# Patient Record
Sex: FEMALE | Race: WHITE | NOT HISPANIC OR LATINO | Employment: UNEMPLOYED | URBAN - METROPOLITAN AREA
[De-identification: names, ages, dates, MRNs, and addresses within clinical notes are randomized per-mention and may not be internally consistent; named-entity substitution may affect disease eponyms.]

---

## 2017-02-01 ENCOUNTER — OFFICE VISIT (OUTPATIENT)
Dept: INTERNAL MEDICINE | Facility: CLINIC | Age: 37
End: 2017-02-01

## 2017-02-01 VITALS
HEART RATE: 74 BPM | HEIGHT: 66 IN | BODY MASS INDEX: 29.42 KG/M2 | OXYGEN SATURATION: 100 % | DIASTOLIC BLOOD PRESSURE: 82 MMHG | SYSTOLIC BLOOD PRESSURE: 138 MMHG | WEIGHT: 183.06 LBS

## 2017-02-01 DIAGNOSIS — E66.3 OVERWEIGHT: Primary | ICD-10-CM

## 2017-02-01 DIAGNOSIS — E66.9 NON MORBID OBESITY, UNSPECIFIED OBESITY TYPE: ICD-10-CM

## 2017-02-01 DIAGNOSIS — G47.00 INSOMNIA, UNSPECIFIED TYPE: ICD-10-CM

## 2017-02-01 PROCEDURE — 99213 OFFICE O/P EST LOW 20 MIN: CPT | Performed by: NURSE PRACTITIONER

## 2017-02-01 RX ORDER — AMITRIPTYLINE HYDROCHLORIDE 25 MG/1
TABLET, FILM COATED ORAL
Qty: 90 TABLET | Refills: 2 | Status: SHIPPED | OUTPATIENT
Start: 2017-02-01 | End: 2017-03-08

## 2017-02-01 NOTE — PROGRESS NOTES
Subjective   Mirian Allred is a 36 y.o. female.     History of Present Illness   The patient is here today to F/U on obesity. Qsymia restarted 12/7/2016. Wt loss goal 8 lbs, pt down 3. She is having a lot of stress with his job. He is in IT,  over network operations. Seh is an emotional eater.   Is having some trouble sleeping as well with stress.   Anxiety- worse, had been on cymbalta in the past but had suicidal ideation.   Is not tracking calories.   The following portions of the patient's history were reviewed and updated as appropriate: allergies, current medications, past family history, past medical history, past social history, past surgical history and problem list.    Review of Systems   Constitutional: Negative.    Respiratory: Negative.    Cardiovascular: Negative.    Psychiatric/Behavioral: Positive for sleep disturbance. Negative for dysphoric mood and suicidal ideas. The patient is nervous/anxious.        Objective   Physical Exam   Constitutional: She appears well-developed and well-nourished.   Neck: Normal range of motion. Neck supple. No thyromegaly present.   Cardiovascular: Normal rate, regular rhythm, normal heart sounds and intact distal pulses.    Pulmonary/Chest: Effort normal and breath sounds normal.   Skin: Skin is warm and dry.   Psychiatric: Her behavior is normal. Judgment and thought content normal. Her mood appears anxious.       Assessment/Plan   Mirian was seen today for obesity, hypertension and heartburn.    Diagnoses and all orders for this visit:    Overweight    Insomnia, unspecified type  -     amitriptyline (ELAVIL) 25 MG tablet; Take 1-3 tablets at bedtime PRN insomnia    Non morbid obesity, unspecified obesity type  -     Phentermine-Topiramate 7.5-46 MG capsule sustained-release 24 hr; Take 1 tablet by mouth Daily.        1. Overweight- continue Qsymia- track calories. Walk when able. Try to focus on food.   2. Insomnia- try elavil 25 mg 1-3 tabs at bedtime.

## 2017-03-03 DIAGNOSIS — Z30.41 ENCOUNTER FOR SURVEILLANCE OF CONTRACEPTIVE PILLS: ICD-10-CM

## 2017-03-03 RX ORDER — DROSPIRENONE AND ETHINYL ESTRADIOL 0.03MG-3MG
KIT ORAL
Qty: 90 TABLET | Refills: 0 | Status: SHIPPED | OUTPATIENT
Start: 2017-03-03 | End: 2017-05-22 | Stop reason: SDUPTHER

## 2017-03-08 ENCOUNTER — OFFICE VISIT (OUTPATIENT)
Dept: INTERNAL MEDICINE | Facility: CLINIC | Age: 37
End: 2017-03-08

## 2017-03-08 VITALS
SYSTOLIC BLOOD PRESSURE: 116 MMHG | OXYGEN SATURATION: 98 % | HEIGHT: 66 IN | HEART RATE: 69 BPM | DIASTOLIC BLOOD PRESSURE: 82 MMHG | WEIGHT: 183.8 LBS | BODY MASS INDEX: 29.54 KG/M2

## 2017-03-08 DIAGNOSIS — E66.3 OVERWEIGHT: Primary | ICD-10-CM

## 2017-03-08 DIAGNOSIS — I10 BENIGN ESSENTIAL HYPERTENSION: ICD-10-CM

## 2017-03-08 DIAGNOSIS — E66.9 NON MORBID OBESITY, UNSPECIFIED OBESITY TYPE: ICD-10-CM

## 2017-03-08 PROCEDURE — 99213 OFFICE O/P EST LOW 20 MIN: CPT | Performed by: NURSE PRACTITIONER

## 2017-03-08 NOTE — PROGRESS NOTES
Subjective   Mirian Allred is a 36 y.o. female.     History of Present Illness   The patient is here today to follow-up on weight.  Q Elen re-initiated December 7, 2016.  Initial weight was 186.9 pounds.  Current weight is 183 pounds.  She has lost 0 weight in the last 4 weeks. At last visit discussed the importance of calorie tracking. Taking Qsymia daily with out SEs.     Insomnia- much improved with adding in exercise, Elavil made her too sleepy.     HTN-Pt is doing well with current medication regimen, denies adverse reactions, compliant with medication schedule.     At home down 3 lbs. She is back at the gym, tracking calories. Feeling better overall. Averaging about 1100 calories a day. She is at the gym 3 times a week.   The following portions of the patient's history were reviewed and updated as appropriate: allergies, current medications, past family history, past medical history, past social history, past surgical history and problem list.    Review of Systems   Constitutional: Negative.    Respiratory: Negative.    Cardiovascular: Negative.    Psychiatric/Behavioral: Negative.        Objective   Physical Exam   Constitutional: She appears well-developed and well-nourished.   Neck: Normal range of motion. Neck supple. No thyromegaly present.   Cardiovascular: Normal rate, regular rhythm, normal heart sounds and intact distal pulses.    Pulmonary/Chest: Effort normal and breath sounds normal.   Skin: Skin is warm and dry.   Psychiatric: She has a normal mood and affect. Her behavior is normal. Judgment and thought content normal.       Assessment/Plan   Mirian was seen today for weight check and insomnia.    Diagnoses and all orders for this visit:    Overweight    Non morbid obesity, unspecified obesity type  -     Phentermine-Topiramate 7.5-46 MG capsule sustained-release 24 hr; Take 1 tablet by mouth Daily.      1. Overweight- needs to increase caloric goal to at least 1400 calories a day,  more with activity. F/U in 6 weeks, goal wt loss is 3 lbs. Qsymia refill given, pt aware of appropriate use and SEs.   2. HTN- continue to monitor as wt is lost.

## 2017-03-22 ENCOUNTER — TELEPHONE (OUTPATIENT)
Dept: INTERNAL MEDICINE | Facility: CLINIC | Age: 37
End: 2017-03-22

## 2017-03-22 DIAGNOSIS — M25.511 RIGHT SHOULDER PAIN, UNSPECIFIED CHRONICITY: Primary | ICD-10-CM

## 2017-03-22 DIAGNOSIS — I10 ESSENTIAL HYPERTENSION: ICD-10-CM

## 2017-03-22 RX ORDER — VALSARTAN 80 MG/1
TABLET ORAL
Qty: 90 TABLET | Refills: 0 | Status: SHIPPED | OUTPATIENT
Start: 2017-03-22 | End: 2017-07-01 | Stop reason: SDUPTHER

## 2017-03-22 NOTE — TELEPHONE ENCOUNTER
----- Message from Arianne Brito sent at 3/22/2017  3:05 PM EDT -----  Patient states when she saw Indu last, she mentioned shoulder pain. She said her shoulder pain has gotten worse. Does she need to be referred to someone?\  Phone: [phone]

## 2017-03-23 ENCOUNTER — TELEPHONE (OUTPATIENT)
Dept: INTERNAL MEDICINE | Facility: CLINIC | Age: 37
End: 2017-03-23

## 2017-03-23 DIAGNOSIS — M25.511 RIGHT SHOULDER PAIN, UNSPECIFIED CHRONICITY: Primary | ICD-10-CM

## 2017-03-23 NOTE — TELEPHONE ENCOUNTER
PT. RETURNED CALL AND SAID THAT HER RT. SHOULDER PAIN IS WORSE AND SHE MENTIONED IT TO NATALI AT HER LAST VISIT BRIEFLY BUT IT WASN'T AS BAD, BUT NOW SHE CANNOT RAISE HER ARM ALL THE WAY UP OR OUT. THE PAIN STARTED 2 MTHS. AGO BUT ITS WORSE NOW.   I DID NOT SEE ANYTHING DOCUMENTED IN NATALI'S LAST VISIT IN REGARDS TO THE SHOULDER PAIN, BUT I WILL ASK DR. LAY WHAT WE CAN DO NOW SINCE NATALI IS OOT.   CELL [phone]

## 2017-03-23 NOTE — TELEPHONE ENCOUNTER
PER DR. LAY  PT. CAN BE REFERRED TO P/T DOWNSTAIRS. I CALLED AND RELAYED THIS TO PT. ENTERED IN REF. AND MARKED URGENT.

## 2017-04-11 ENCOUNTER — OFFICE VISIT (OUTPATIENT)
Dept: SPORTS MEDICINE | Facility: CLINIC | Age: 37
End: 2017-04-11

## 2017-04-11 VITALS
BODY MASS INDEX: 29.41 KG/M2 | WEIGHT: 183 LBS | HEIGHT: 66 IN | SYSTOLIC BLOOD PRESSURE: 116 MMHG | DIASTOLIC BLOOD PRESSURE: 68 MMHG

## 2017-04-11 DIAGNOSIS — M75.41 SHOULDER IMPINGEMENT, RIGHT: ICD-10-CM

## 2017-04-11 DIAGNOSIS — M25.511 CHRONIC RIGHT SHOULDER PAIN: Primary | ICD-10-CM

## 2017-04-11 DIAGNOSIS — G89.29 CHRONIC RIGHT SHOULDER PAIN: Primary | ICD-10-CM

## 2017-04-11 DIAGNOSIS — G25.89 SCAPULAR DYSKINESIS: ICD-10-CM

## 2017-04-11 PROCEDURE — 99244 OFF/OP CNSLTJ NEW/EST MOD 40: CPT | Performed by: FAMILY MEDICINE

## 2017-04-11 PROCEDURE — 73030 X-RAY EXAM OF SHOULDER: CPT | Performed by: FAMILY MEDICINE

## 2017-04-11 RX ORDER — IBUPROFEN 800 MG/1
800 TABLET ORAL EVERY 8 HOURS
COMMUNITY
End: 2017-04-11

## 2017-04-11 RX ORDER — SIMVASTATIN 20 MG
20 TABLET ORAL
COMMUNITY
End: 2017-05-26

## 2017-04-11 RX ORDER — NORGESTIMATE AND ETHINYL ESTRADIOL 0.25-0.035
1 KIT ORAL
COMMUNITY
End: 2017-05-22

## 2017-04-11 RX ORDER — DICLOFENAC SODIUM 75 MG/1
75 TABLET, DELAYED RELEASE ORAL 2 TIMES DAILY
Qty: 60 TABLET | Refills: 1 | Status: SHIPPED | OUTPATIENT
Start: 2017-04-11 | End: 2017-07-17

## 2017-04-11 RX ORDER — DULOXETIN HYDROCHLORIDE 30 MG/1
30 CAPSULE, DELAYED RELEASE ORAL
COMMUNITY
End: 2017-07-17

## 2017-04-11 NOTE — PROGRESS NOTES
"Mirian is a 36 y.o. year old female    Chief Complaint   Patient presents with   • Right Shoulder - Pain       History of Present Illness  Mirian is referred here today for right shoulder pain by Indu Horne. She describes having pain for at least 3 months, started without any injury but she is exercising frequently as part of a weight loss regimen (has lost 100 pounds so far). Pain is constant, gradually worsening, moderately severe, mostly tight in nature. Worse with use of the arm. Radiates from the shoulder down the arm and around the back of the shoulder as well. No assoc sx.     I have reviewed the patient's medical history in detail and updated the computerized patient record.    Review of Systems   Constitutional: Negative for fever.   Musculoskeletal: Negative for neck pain.   Skin: Negative for wound.   Neurological: Negative for weakness and numbness.   All other systems reviewed and are negative.      /68  Ht 66\" (167.6 cm)  Wt 183 lb (83 kg)  BMI 29.54 kg/m2     Physical Exam    Vital signs reviewed.   General: No acute distress.  Eyes: conjunctiva clear; pupils equally round and reactive  ENT: external ears and nose atraumatic; oropharynx clear  CV: no peripheral edema, 2+ distal pulses  Resp: normal respiratory effort, no use of accessory muscles  Skin: no rashes or wounds; normal turgor  Psych: mood and affect appropriate; recent and remote memory intact  Neuro: sensation to light touch intact    MSK Exam:  R shoulder: Normal appearance except scapular asymmetry at rest and with movement. TTP with trigger points in the levator scapulae and rhomboids. Normal ROM. Neg Neer, pos Melton. Normal cuff strength but pain with ER and empty can. Neg jesus and yergason.     Right Shoulder X-Ray  Indication: Pain  AP Internal and External Rotation, Axillary views    Findings:  No fracture  No bony lesion  Normal soft tissues  Normal joint spaces    No prior studies were available for " comparison.      Diagnoses and all orders for this visit:    Chronic right shoulder pain  -     XR Shoulder 2+ View Right  -     Ambulatory Referral to Physical Therapy Evaluate and treat  -     diclofenac (VOLTAREN) 75 MG EC tablet; Take 1 tablet by mouth 2 (Two) Times a Day.    Shoulder impingement, right  -     Ambulatory Referral to Physical Therapy Evaluate and treat  -     diclofenac (VOLTAREN) 75 MG EC tablet; Take 1 tablet by mouth 2 (Two) Times a Day.    Scapular dyskinesis  -     Ambulatory Referral to Physical Therapy Evaluate and treat  -     diclofenac (VOLTAREN) 75 MG EC tablet; Take 1 tablet by mouth 2 (Two) Times a Day.    Other orders  -     simvastatin (ZOCOR) 20 MG tablet; Take 20 mg by mouth.  -     norgestimate-ethinyl estradiol (ORTHO-CYCLEN) 0.25-35 MG-MCG per tablet; Take 1 tablet by mouth.  -     DULoxetine (CYMBALTA) 30 MG capsule; Take 30 mg by mouth.  -     Discontinue: ibuprofen (ADVIL,MOTRIN) 800 MG tablet; Take 800 mg by mouth Every 8 (Eight) Hours.    This appears most likely to be a combination of impingement and scapular dysfunction. Will start by optimizing pain relief to tolerate PT, consider steroid burst if diclofenac doesn't work better than ibuprofen. May need to consider trigger point and subacromial injections as well. Recheck for progress in about 4 weeks.

## 2017-04-13 ENCOUNTER — TREATMENT (OUTPATIENT)
Dept: PHYSICAL THERAPY | Facility: CLINIC | Age: 37
End: 2017-04-13

## 2017-04-13 DIAGNOSIS — M25.511 CHRONIC RIGHT SHOULDER PAIN: Primary | ICD-10-CM

## 2017-04-13 DIAGNOSIS — G89.29 CHRONIC RIGHT SHOULDER PAIN: Primary | ICD-10-CM

## 2017-04-13 PROCEDURE — 97110 THERAPEUTIC EXERCISES: CPT | Performed by: PHYSICAL THERAPIST

## 2017-04-13 PROCEDURE — 97161 PT EVAL LOW COMPLEX 20 MIN: CPT | Performed by: PHYSICAL THERAPIST

## 2017-04-13 NOTE — PROGRESS NOTES
Physical Therapy Initial Evaluation and Plan of Care    Patient: Mirian Allred   : 1980  Diagnosis/ICD-10 Code:  Chronic right shoulder pain [M25.511, G89.29]  Referring practitioner: Inder Pichardo MD    Subjective Evaluation    History of Present Illness  Onset date: 3 mos ago.  Mechanism of injury: Pt reports she is unsure as to what caused pain. Took a week off from the gym and woke up one morning in pain. Pt states pain is in superior right shoulder, radiates into upper arm and collarbone/chest. Can also feel pain in shoulder blade and neck. Denies sharp pain, numbness, or tingling. Describes pain as ache that increases with activity. Pain worsens throughout the day and with increased activity. Anti-inflammatory seems to be helping. Pt states she is a side sleeper, but lately, has not been able to sleep on R side. Showering, washing hair, drying hair, and doing makeup cause intense pain. Has not been able to go the gym. Using elliptical with arms is painful.  Pt is a stay at home mom.  Hx of R shoulder pain several years ago with no identifiable cause. Pt reports pinching nerve with intense, sharp pain. Resolved w/ muscle relaxers.    Quality of life: good    Pain  Current pain ratin  At best pain ratin  At worst pain ratin  Location: Superior right shoulder, radiates into upper arm, collarbone, and chest. Can also feel pain in shoulder blade and neck.  Quality: dull ache  Relieving factors: medications and change in position  Aggravating factors: overhead activity, lifting and movement  Progression: no change (Improved w/ anti-inflammatory)    Social Support  Lives with: spouse and young children    Hand dominance: right    Diagnostic Tests  X-ray: normal    Treatments  Previous treatment: medication  Current treatment: medication and physical therapy  Patient Goals  Patient goals for therapy: decreased pain, increased strength and return to sport/leisure activities  Patient goal:  How to avoid this in the future    STG 2 weeks  Pt will be independent with initial HEP  Pt will report pain <5/10 w/ ADLs  Pt will be able to tolerate therapeutic strengthening w/o increased s/s    LTG 6 weeks  Pt will have 5/5 UE strength to allow for ADLs  Pt will be able to perform self-care routine w/o significant pain or limitation  Pt will demonstrate proper head and shoulder posture while seated w/ min to no verbal cuing  Pt will score </= 15 on QuickDASH           Objective     Postural Observations  Seated posture: fair        Tenderness     Right Shoulder  No tenderness     Cervical/Thoracic Screen   Cervical range of motion within normal limits  Thoracic range of motion within normal limits with the following exceptions: Limited thoracic extension and rotation lionel    Active Range of Motion   Left Shoulder   Flexion: WFL  Abduction: WFL  External rotation BTH: T1   Internal rotation BTB: T5     Right Shoulder   Flexion: WFL  Abduction: WFL  External rotation BTH: T1 WFL  Internal rotation BTB: T7 WFL    Additional Active Range of Motion Details  Reports tightness w/ IR reach on R    Joint Play     Right Shoulder  Hypermobile in the anterior capsule. Hypomobile in the posterior capsule.     Additional Joint Play Details  Anterior translation of R humeral head    Strength/Myotome Testing     Left Shoulder     Planes of Motion   Flexion: 4+   Abduction: 5   External rotation at 0°: 5   Internal rotation at 0°: 5     Isolated Muscles   Biceps: 5   Triceps: 5     Right Shoulder     Planes of Motion   Flexion: 4+   Abduction: 5   External rotation at 0°: 5   Internal rotation at 0°: 5     Isolated Muscles   Biceps: 5   Upper trapezius: 5     Tests     Right Shoulder   Positive anterior load and shift, active compression ([place]), empty can, Hawkin's and Neer's.   Negative full can.          Assessment & Plan     Assessment  Impairments: abnormal muscle tone, abnormal or restricted ROM, activity intolerance,  impaired physical strength, lacks appropriate home exercise program and pain with function  Assessment details: Pt will benefit from skilled PT services in order to address listed impairments and increase tolerance to normal daily activities including ADL's, work and recreational activities.    Prognosis: good    Goals  How to avoid this in the future    STG 2 weeks  Pt will be independent with initial HEP  Pt will report pain <5/10 w/ ADLs  Pt will be able to tolerate therapeutic strengthening w/o increased s/s    LTG 6 weeks  Pt will have 5/5 UE strength to allow for ADLs  Pt will be able to perform self-care routine w/o significant pain or limitation  Pt will demonstrate proper head and shoulder posture while seated w/ min to no verbal cuing  Pt will score </= 15 on QuickDASH    Plan  Therapy options: will be seen for skilled physical therapy services  Planned modality interventions: cryotherapy, electrical stimulation/[place] stimulation, ultrasound, iontophoresis and thermotherapy (hydrocollator packs)  Planned therapy interventions: postural training, soft tissue mobilization, joint mobilization, stretching, strengthening, home exercise program, functional ROM exercises, flexibility, body mechanics training, manual therapy and neuromuscular re-education  Frequency: 3x week  Duration in weeks: 6  Treatment plan discussed with: patient        Manual Therapy:    8     mins  49984;  Therapeutic Exercise:    10     mins  69180;     Neuromuscular Alessandra:    0    mins  24488;    Therapeutic Activity:     0     mins  48206;     Gait Trainin     mins  64129;     Ultrasound:     0     mins  98841;    Electrical Stimulation:    0     mins  40875 ( );  Dry Needling     0     mins self-pay    Timed Treatment:   18   mins   Total Treatment:     18   mins    PT SIGNATURE: Priscila Lara PT   DATE TREATMENT INITIATED: 2017    Initial Certification  Certification Period: 2017  I certify that the  therapy services are furnished while this patient is under my care.  The services outlined above are required by this patient, and will be reviewed every 90 days.     PHYSICIAN: Inder Pichardo MD      DATE:     Please sign and return via fax to [phone].. Thank you, [facility].

## 2017-04-19 ENCOUNTER — TREATMENT (OUTPATIENT)
Dept: PHYSICAL THERAPY | Facility: CLINIC | Age: 37
End: 2017-04-19

## 2017-04-19 DIAGNOSIS — M25.511 CHRONIC RIGHT SHOULDER PAIN: Primary | ICD-10-CM

## 2017-04-19 DIAGNOSIS — G89.29 CHRONIC RIGHT SHOULDER PAIN: Primary | ICD-10-CM

## 2017-04-19 PROCEDURE — 97140 MANUAL THERAPY 1/> REGIONS: CPT | Performed by: PHYSICAL THERAPIST

## 2017-04-19 PROCEDURE — 97110 THERAPEUTIC EXERCISES: CPT | Performed by: PHYSICAL THERAPIST

## 2017-04-19 NOTE — PROGRESS NOTES
Physical Therapy Daily Progress Note    Time In 1028  Time Out 1107    [facility] reports: No issues with HEP and paion is better than it was. Backed off of medicine.    Subjective     Objective   See Exercise, Manual, and Modality Logs for complete treatment.       Assessment/Plan  Pt continues to have pain-free PROM WNL. Resisted bilateral external rotation reproduced pt's symptoms of anterior shoulder pain. Tolerated isolated ER well and added to HEP. Pt will continue to benefit from skilled PT to improve R shoulder posture and stability and decrease pain with activity.           Manual Therapy:    10     mins  58226;  Therapeutic Exercise:    29     mins  42247;     Neuromuscular Alessandra:    0    mins  90510;    Therapeutic Activity:     0     mins  94054;     Gait Trainin     mins  45269;     Ultrasound:     0     mins  79601;    Work Hardening           0      mins 02451  Iontophoresis               0   mins 96510    Timed Treatment:   39   mins   Total Treatment:     39   mins    Priscila Lara, PT  Physical Therapist

## 2017-04-27 ENCOUNTER — TREATMENT (OUTPATIENT)
Dept: PHYSICAL THERAPY | Facility: CLINIC | Age: 37
End: 2017-04-27

## 2017-04-27 DIAGNOSIS — G89.29 CHRONIC RIGHT SHOULDER PAIN: Primary | ICD-10-CM

## 2017-04-27 DIAGNOSIS — M25.511 CHRONIC RIGHT SHOULDER PAIN: Primary | ICD-10-CM

## 2017-04-27 PROCEDURE — 97110 THERAPEUTIC EXERCISES: CPT | Performed by: PHYSICAL THERAPIST

## 2017-04-27 PROCEDURE — G0283 ELEC STIM OTHER THAN WOUND: HCPCS | Performed by: PHYSICAL THERAPIST

## 2017-04-27 NOTE — PROGRESS NOTES
Physical Therapy Daily Progress Note    Time In 930  Time Out 1020    Mirian Allred reports: More sore in R upper arm/shoulder. Feels like a bruise. Might have been how I slept. Haven't been able to do HEP much due to illness in family.    Subjective     Objective   PROM WNL  Non-TTP    See Exercise, Manual, and Modality Logs for complete treatment.       Assessment/Plan  Pt demonstrated minimal limitations in ROM during therapeutic exercise. Tolerated increase in strengthening well w/o c/o pain. Required cues for posture. Will continue to benefit from strengthening to increase shoulder stability and decrease pain with activity.                Manual Therapy:    10     mins  94111;  Therapeutic Exercise:    25     mins  07945;     Neuromuscular Alessandra:    0    mins  65210;    Therapeutic Activity:     0     mins  13637;     Gait Trainin     mins  48722;     Ultrasound:     0     mins  38084;    Work Hardening           0      mins 84185  Iontophoresis               0   mins 03799    Timed Treatment:   35   mins   Total Treatment:     50   mins    Priscila Lara, PT  Physical Therapist

## 2017-05-16 ENCOUNTER — OFFICE VISIT (OUTPATIENT)
Dept: SPORTS MEDICINE | Facility: CLINIC | Age: 37
End: 2017-05-16

## 2017-05-16 VITALS
SYSTOLIC BLOOD PRESSURE: 118 MMHG | HEIGHT: 66 IN | DIASTOLIC BLOOD PRESSURE: 70 MMHG | WEIGHT: 183 LBS | BODY MASS INDEX: 29.41 KG/M2

## 2017-05-16 DIAGNOSIS — G25.89 SCAPULAR DYSKINESIS: ICD-10-CM

## 2017-05-16 DIAGNOSIS — M75.41 SHOULDER IMPINGEMENT, RIGHT: Primary | ICD-10-CM

## 2017-05-16 PROCEDURE — 20610 DRAIN/INJ JOINT/BURSA W/O US: CPT | Performed by: FAMILY MEDICINE

## 2017-05-16 PROCEDURE — 99213 OFFICE O/P EST LOW 20 MIN: CPT | Performed by: FAMILY MEDICINE

## 2017-05-16 RX ORDER — TRIAMCINOLONE ACETONIDE 40 MG/ML
80 INJECTION, SUSPENSION INTRA-ARTICULAR; INTRAMUSCULAR ONCE
Status: COMPLETED | OUTPATIENT
Start: 2017-05-16 | End: 2017-05-16

## 2017-05-16 RX ADMIN — TRIAMCINOLONE ACETONIDE 80 MG: 40 INJECTION, SUSPENSION INTRA-ARTICULAR; INTRAMUSCULAR at 11:16

## 2017-05-19 ENCOUNTER — TREATMENT (OUTPATIENT)
Dept: PHYSICAL THERAPY | Facility: CLINIC | Age: 37
End: 2017-05-19

## 2017-05-19 DIAGNOSIS — G89.29 CHRONIC RIGHT SHOULDER PAIN: Primary | ICD-10-CM

## 2017-05-19 DIAGNOSIS — M25.511 CHRONIC RIGHT SHOULDER PAIN: Primary | ICD-10-CM

## 2017-05-19 PROCEDURE — 97110 THERAPEUTIC EXERCISES: CPT | Performed by: PHYSICAL THERAPIST

## 2017-05-22 DIAGNOSIS — Z30.41 ENCOUNTER FOR SURVEILLANCE OF CONTRACEPTIVE PILLS: ICD-10-CM

## 2017-05-22 RX ORDER — DROSPIRENONE AND ETHINYL ESTRADIOL 0.03MG-3MG
1 KIT ORAL DAILY
Qty: 90 TABLET | Refills: 0 | Status: SHIPPED | OUTPATIENT
Start: 2017-05-22 | End: 2017-08-15 | Stop reason: SDUPTHER

## 2017-05-26 ENCOUNTER — OFFICE VISIT (OUTPATIENT)
Dept: INTERNAL MEDICINE | Facility: CLINIC | Age: 37
End: 2017-05-26

## 2017-05-26 VITALS
SYSTOLIC BLOOD PRESSURE: 116 MMHG | HEIGHT: 66 IN | HEART RATE: 81 BPM | OXYGEN SATURATION: 99 % | WEIGHT: 181 LBS | BODY MASS INDEX: 29.09 KG/M2 | DIASTOLIC BLOOD PRESSURE: 68 MMHG

## 2017-05-26 DIAGNOSIS — K21.9 GASTROESOPHAGEAL REFLUX DISEASE, ESOPHAGITIS PRESENCE NOT SPECIFIED: Primary | ICD-10-CM

## 2017-05-26 DIAGNOSIS — E66.3 OVERWEIGHT: ICD-10-CM

## 2017-05-26 DIAGNOSIS — I10 ESSENTIAL HYPERTENSION: ICD-10-CM

## 2017-05-26 DIAGNOSIS — E66.9 NON MORBID OBESITY, UNSPECIFIED OBESITY TYPE: ICD-10-CM

## 2017-05-26 PROCEDURE — 99214 OFFICE O/P EST MOD 30 MIN: CPT | Performed by: NURSE PRACTITIONER

## 2017-05-26 RX ORDER — OMEPRAZOLE 20 MG/1
20 CAPSULE, DELAYED RELEASE ORAL DAILY
Qty: 30 CAPSULE | Refills: 2
Start: 2017-05-26 | End: 2017-11-09

## 2017-06-14 ENCOUNTER — OFFICE VISIT (OUTPATIENT)
Dept: SPORTS MEDICINE | Facility: CLINIC | Age: 37
End: 2017-06-14

## 2017-06-14 VITALS
BODY MASS INDEX: 29.09 KG/M2 | SYSTOLIC BLOOD PRESSURE: 114 MMHG | HEIGHT: 66 IN | DIASTOLIC BLOOD PRESSURE: 68 MMHG | WEIGHT: 181 LBS

## 2017-06-14 DIAGNOSIS — G89.29 CHRONIC RIGHT SHOULDER PAIN: Primary | ICD-10-CM

## 2017-06-14 DIAGNOSIS — M25.511 CHRONIC RIGHT SHOULDER PAIN: Primary | ICD-10-CM

## 2017-06-14 DIAGNOSIS — M75.41 SHOULDER IMPINGEMENT, RIGHT: ICD-10-CM

## 2017-06-14 PROCEDURE — 99213 OFFICE O/P EST LOW 20 MIN: CPT | Performed by: FAMILY MEDICINE

## 2017-06-14 RX ORDER — DIAZEPAM 5 MG/1
TABLET ORAL
Qty: 1 TABLET | Refills: 0 | Status: SHIPPED | OUTPATIENT
Start: 2017-06-14 | End: 2017-07-17

## 2017-06-15 NOTE — PROGRESS NOTES
"Mirian is a 36 y.o. year old female    Chief Complaint   Patient presents with   • Right Shoulder - Follow-up, Pain       History of Present Illness  Unfortunately since last visit her shoulder has actually worsened, sharp, constant pain worsening with use. Injection did not help. Had to stop PT due to scheduling.     I have reviewed the patient's medical history in detail and updated the computerized patient record.    Review of Systems   Musculoskeletal: Positive for arthralgias.   Neurological: Negative for numbness.       /68  Ht 66\" (167.6 cm)  Wt 181 lb (82.1 kg)  BMI 29.21 kg/m2     Physical Exam    Vital signs reviewed.   General: No acute distress.  Eyes: conjunctiva clear; pupils equally round and reactive  ENT: external ears and nose atraumatic; oropharynx clear  CV: no peripheral edema, 2+ distal pulses  Resp: normal respiratory effort, no use of accessory muscles  Skin: no rashes or wounds; normal turgor  Psych: mood and affect appropriate; recent and remote memory intact  Neuro: sensation to light touch intact    MSK Exam:  R shoulder: Normal appearance. TTP subacromial space. ROM wnl but pain with FF and IR. +Neer/Melton. Normal RTC strength but pain with empty can. Neg Cabrera, pos crank. Neg yergason/speed.     Diagnoses and all orders for this visit:    Chronic right shoulder pain  -     FL Contrast Injection CT / MRI; Future  -     MRI shoulder right arthrogram; Future    Shoulder impingement, right  -     FL Contrast Injection CT / MRI; Future  -     MRI shoulder right arthrogram; Future    Other orders  -     diazePAM (VALIUM) 5 MG tablet; Take 1 tab by mouth 30 minutes prior to MRI    MRI to evaluate for underlying cause with failure to respond to conservative treatment x 8 weeks including PT and injection. Possible rotator cuff or labral tear.   "

## 2017-06-29 ENCOUNTER — HOSPITAL ENCOUNTER (OUTPATIENT)
Dept: MRI IMAGING | Facility: HOSPITAL | Age: 37
Discharge: HOME | End: 2017-06-29

## 2017-06-29 ENCOUNTER — HOSPITAL ENCOUNTER (OUTPATIENT)
Dept: GENERAL RADIOLOGY | Facility: HOSPITAL | Age: 37
Discharge: HOME | End: 2017-06-29
Admitting: FAMILY MEDICINE

## 2017-06-29 DIAGNOSIS — IMO0001 SUPRASPINATUS TENDON TEAR, RIGHT, SUBSEQUENT ENCOUNTER: Primary | ICD-10-CM

## 2017-06-29 DIAGNOSIS — M25.511 CHRONIC RIGHT SHOULDER PAIN: ICD-10-CM

## 2017-06-29 DIAGNOSIS — M75.41 SHOULDER IMPINGEMENT, RIGHT: ICD-10-CM

## 2017-06-29 DIAGNOSIS — G89.29 CHRONIC RIGHT SHOULDER PAIN: ICD-10-CM

## 2017-06-29 PROCEDURE — A9577 INJ MULTIHANCE: HCPCS | Performed by: RADIOLOGY

## 2017-06-29 PROCEDURE — 82565 ASSAY OF CREATININE: CPT

## 2017-06-29 PROCEDURE — 73222 MRI JOINT UPR EXTREM W/DYE: CPT

## 2017-06-29 PROCEDURE — 0 GADOBENATE DIMEGLUMINE 529 MG/ML SOLUTION: Performed by: RADIOLOGY

## 2017-06-29 PROCEDURE — 0 IOPAMIDOL 61 % SOLUTION: Performed by: RADIOLOGY

## 2017-06-29 PROCEDURE — 77002 NEEDLE LOCALIZATION BY XRAY: CPT

## 2017-06-29 RX ORDER — LIDOCAINE HYDROCHLORIDE 10 MG/ML
10 INJECTION, SOLUTION INFILTRATION; PERINEURAL ONCE
Status: COMPLETED | OUTPATIENT
Start: 2017-06-29 | End: 2017-06-29

## 2017-06-29 RX ADMIN — LIDOCAINE HYDROCHLORIDE 2 ML: 10 INJECTION, SOLUTION INFILTRATION; PERINEURAL at 09:49

## 2017-06-29 RX ADMIN — GADOBENATE DIMEGLUMINE 0.05 ML: 529 INJECTION, SOLUTION INTRAVENOUS at 09:49

## 2017-06-29 RX ADMIN — IOPAMIDOL 5 ML: 612 INJECTION, SOLUTION INTRAVENOUS at 09:49

## 2017-06-30 LAB — CREAT BLDA-MCNC: 1 MG/DL (ref 0.6–1.3)

## 2017-07-01 DIAGNOSIS — I10 ESSENTIAL HYPERTENSION: ICD-10-CM

## 2017-07-03 RX ORDER — VALSARTAN 80 MG/1
TABLET ORAL
Qty: 90 TABLET | Refills: 2 | Status: SHIPPED | OUTPATIENT
Start: 2017-07-03 | End: 2017-07-17

## 2017-07-07 ENCOUNTER — RESULTS ENCOUNTER (OUTPATIENT)
Dept: INTERNAL MEDICINE | Facility: CLINIC | Age: 37
End: 2017-07-07

## 2017-07-07 DIAGNOSIS — E66.3 OVERWEIGHT: ICD-10-CM

## 2017-07-07 DIAGNOSIS — I10 ESSENTIAL HYPERTENSION: ICD-10-CM

## 2017-07-07 DIAGNOSIS — K21.9 GASTROESOPHAGEAL REFLUX DISEASE, ESOPHAGITIS PRESENCE NOT SPECIFIED: ICD-10-CM

## 2017-07-11 ENCOUNTER — HOSPITAL ENCOUNTER (OUTPATIENT)
Facility: HOSPITAL | Age: 37
Setting detail: HOSPITAL OUTPATIENT SURGERY
End: 2017-07-11
Attending: ORTHOPAEDIC SURGERY | Admitting: ORTHOPAEDIC SURGERY

## 2017-07-17 ENCOUNTER — APPOINTMENT (OUTPATIENT)
Dept: PREADMISSION TESTING | Facility: HOSPITAL | Age: 37
End: 2017-07-17

## 2017-07-17 ENCOUNTER — TELEPHONE (OUTPATIENT)
Dept: INTERNAL MEDICINE | Facility: CLINIC | Age: 37
End: 2017-07-17

## 2017-07-17 VITALS
TEMPERATURE: 97.6 F | RESPIRATION RATE: 16 BRPM | HEIGHT: 66 IN | OXYGEN SATURATION: 100 % | BODY MASS INDEX: 29.49 KG/M2 | HEART RATE: 90 BPM | WEIGHT: 183.5 LBS

## 2017-07-17 LAB
ALBUMIN SERPL-MCNC: 3.8 G/DL (ref 3.5–5.2)
ALBUMIN/GLOB SERPL: 1.2 G/DL
ALP SERPL-CCNC: 74 U/L (ref 39–117)
ALT SERPL W P-5'-P-CCNC: 13 U/L (ref 1–33)
ANION GAP SERPL CALCULATED.3IONS-SCNC: 10.4 MMOL/L
AST SERPL-CCNC: 12 U/L (ref 1–32)
BASOPHILS # BLD AUTO: 0.07 10*3/MM3 (ref 0–0.2)
BASOPHILS NFR BLD AUTO: 0.7 % (ref 0–1.5)
BILIRUB SERPL-MCNC: 0.6 MG/DL (ref 0.1–1.2)
BUN BLD-MCNC: 12 MG/DL (ref 6–20)
BUN/CREAT SERPL: 15.2 (ref 7–25)
CALCIUM SPEC-SCNC: 9.5 MG/DL (ref 8.6–10.5)
CHLORIDE SERPL-SCNC: 105 MMOL/L (ref 98–107)
CO2 SERPL-SCNC: 24.6 MMOL/L (ref 22–29)
CREAT BLD-MCNC: 0.79 MG/DL (ref 0.57–1)
DEPRECATED RDW RBC AUTO: 44 FL (ref 37–54)
EOSINOPHIL # BLD AUTO: 0.2 10*3/MM3 (ref 0–0.7)
EOSINOPHIL NFR BLD AUTO: 2.1 % (ref 0.3–6.2)
ERYTHROCYTE [DISTWIDTH] IN BLOOD BY AUTOMATED COUNT: 13.3 % (ref 11.7–13)
GFR SERPL CREATININE-BSD FRML MDRD: 83 ML/MIN/1.73
GLOBULIN UR ELPH-MCNC: 3.2 GM/DL
GLUCOSE BLD-MCNC: 91 MG/DL (ref 65–99)
HCG SERPL QL: NEGATIVE
HCT VFR BLD AUTO: 40.3 % (ref 35.6–45.5)
HGB BLD-MCNC: 13.4 G/DL (ref 11.9–15.5)
IMM GRANULOCYTES # BLD: 0.02 10*3/MM3 (ref 0–0.03)
IMM GRANULOCYTES NFR BLD: 0.2 % (ref 0–0.5)
LYMPHOCYTES # BLD AUTO: 1.85 10*3/MM3 (ref 0.9–4.8)
LYMPHOCYTES NFR BLD AUTO: 19 % (ref 19.6–45.3)
MCH RBC QN AUTO: 29.9 PG (ref 26.9–32)
MCHC RBC AUTO-ENTMCNC: 33.3 G/DL (ref 32.4–36.3)
MCV RBC AUTO: 90 FL (ref 80.5–98.2)
MONOCYTES # BLD AUTO: 0.69 10*3/MM3 (ref 0.2–1.2)
MONOCYTES NFR BLD AUTO: 7.1 % (ref 5–12)
NEUTROPHILS # BLD AUTO: 6.91 10*3/MM3 (ref 1.9–8.1)
NEUTROPHILS NFR BLD AUTO: 70.9 % (ref 42.7–76)
PLATELET # BLD AUTO: 301 10*3/MM3 (ref 140–500)
PMV BLD AUTO: 10.1 FL (ref 6–12)
POTASSIUM BLD-SCNC: 3.7 MMOL/L (ref 3.5–5.2)
PROT SERPL-MCNC: 7 G/DL (ref 6–8.5)
RBC # BLD AUTO: 4.48 10*6/MM3 (ref 3.9–5.2)
SODIUM BLD-SCNC: 140 MMOL/L (ref 136–145)
WBC NRBC COR # BLD: 9.74 10*3/MM3 (ref 4.5–10.7)

## 2017-07-17 PROCEDURE — 80053 COMPREHEN METABOLIC PANEL: CPT | Performed by: ORTHOPAEDIC SURGERY

## 2017-07-17 PROCEDURE — 85025 COMPLETE CBC W/AUTO DIFF WBC: CPT | Performed by: ORTHOPAEDIC SURGERY

## 2017-07-17 PROCEDURE — 36415 COLL VENOUS BLD VENIPUNCTURE: CPT

## 2017-07-17 PROCEDURE — 84703 CHORIONIC GONADOTROPIN ASSAY: CPT | Performed by: ORTHOPAEDIC SURGERY

## 2017-07-17 PROCEDURE — 93010 ELECTROCARDIOGRAM REPORT: CPT | Performed by: INTERNAL MEDICINE

## 2017-07-17 PROCEDURE — 93005 ELECTROCARDIOGRAM TRACING: CPT

## 2017-07-17 RX ORDER — TOPIRAMATE 25 MG/1
25 TABLET ORAL DAILY
Qty: 30 TABLET | Refills: 0 | Status: SHIPPED | OUTPATIENT
Start: 2017-07-17 | End: 2017-10-24

## 2017-07-17 RX ORDER — VALSARTAN 80 MG/1
80 TABLET ORAL NIGHTLY
COMMUNITY
End: 2018-03-20

## 2017-07-17 NOTE — TELEPHONE ENCOUNTER
07.17.17 [facility]  Per verbal order from Indu Horne NP. Qsymia 3.75/25MG Take 2 PO for x4 then Take 1 PO x4 then stop. RX was called into [facility] pharmacy.    Patient informed of ween down schedule. Patient stated she is unable to do the ween down schedule due to her surgery being rescheduled sometime the first week in August. I consulted Indu Horne NP and per guidelines the patient must ween off medication for at least one week, which we gave her eight days. I informed the patient numerous times that we cannot call in an independent RX of Topamax because she is not currently taking Topamax by itself and the medication will work differently. Patient verbalized understanding. I informed the patient she has to complete the ween down schedule for safety reasons and it will be her surgeon's decision if he wants to complete the surgery or else she may have to push her surgery out. Patient verbalized understanding.    ----- Message -----     From: DERRICK Jim     Sent: 7/17/2017   3:42 PM       To: Aidee Orta MA    Decrease dose to 3.25 mg/23, take 2 tabs for 4 days, then 1 tab for 4 days then stop.   ----- Message -----     From: Aidee Orta MA     Sent: 7/17/2017  11:30 AM       To: DERRICK Jim        ----- Message -----     From: Arianne Brito     Sent: 7/17/2017  11:09 AM       To: Aidee Orta MA    Pt was scheduled for a surgery but it was cancelled due to her being on Qsymia. She needs to know if she can quit cold turkey or how what she needs to do in order to get her surgery rescheduled.  Phone: [phone]

## 2017-07-17 NOTE — DISCHARGE INSTRUCTIONS
Take the following medications the morning of surgery with a small sip of water:        General Instructions:  • Do not eat solid food after midnight the night before surgery.  • You may drink clear liquids day of surgery but must stop at least one hour before your hospital arrival time.  • It is beneficial for you to have a clear drink that contains carbohydrates the day of surgery.  We suggest a 20 ounce bottle of Gatorade or Powerade for non-diabetic patients or a 20 ounce bottle of G2 or Powerade Zero for diabetic patients. (Pediatric patients, are not advised to drink a 20 ounce carbohydrate drink)    Clear liquids are liquids you can see through.  Nothing red in color.     Plain water                               Sports drinks  Sodas                                   Gelatin (Jell-O)  Fruit juices without pulp such as white grape juice and apple juice  Popsicles that contain no fruit or yogurt  Tea or coffee (no cream or milk added)  Gatorade / Powerade  G2 / Powerade Zero    • Infants may have breast milk up to four hours before surgery.  • Infants drinking formula may drink formula up to six hours before surgery.   • Patients who avoid smoking, chewing tobacco and alcohol for 4 weeks prior to surgery have a reduced risk of post-operative complications.  Quit smoking as many days before surgery as you can.  • Do not smoke, use chewing tobacco or drink alcohol the day of surgery.   • If applicable bring your C-PAP/ BI-PAP machine.  • Bring any papers given to you in the doctor’s office.  • Wear clean comfortable clothes and socks.  • Do not wear contact lenses or make-up.  Bring a case for your glasses.   • Bring crutches or walker if applicable.  • Leave all other valuables and jewelry at home.  • The Pre-Admission Testing nurse will instruct you to bring medications if unable to obtain an accurate list in Pre-Admission Testing.        If you were given a blood bank ID arm band remember to bring it with you  the day of surgery.    Preventing a Surgical Site Infection:  • For 2 to 3 days before surgery, avoid shaving with a razor because the razor can irritate skin and make it easier to develop an infection.  • The night prior to surgery sleep in a clean bed with clean clothing.  Do not allow pets to sleep with you.  • Shower on the morning of surgery using a fresh bar of anti-bacterial soap (such as Dial) and clean washcloth.  Dry with a clean towel and dress in clean clothing.  • Ask your surgeon if you will be receiving antibiotics prior to surgery.  • Make sure you, your family, and all healthcare providers clean their hands with soap and water or an alcohol based hand  before caring for you or your wound.    Day of surgery:  Upon arrival, a Pre-op nurse and Anesthesiologist will review your health history, obtain vital signs, and answer questions you may have.  The only belongings needed at this time will be your home medications and if applicable your C-PAP/BI-PAP machine.  If you are staying overnight your family can leave the rest of your belongings in the car and bring them to your room later.  A Pre-op nurse will start an IV and you may receive medication in preparation for surgery, including something to help you relax.  Your family will be able to see you in the Pre-op area.  While you are in surgery your family should notify the waiting room  if they leave the waiting room area and provide a contact phone number.    Please be aware that surgery does come with discomfort.  We want to make every effort to control your discomfort so please discuss any uncontrolled symptoms with your nurse.   Your doctor will most likely have prescribed pain medications.      If you are going home after surgery you will receive individualized written care instructions before being discharged.  A responsible adult must drive you to and from the hospital on the day of your surgery and stay with you for 24  hours.    If you are staying overnight following surgery, you will be transported to your hospital room following the recovery period.  [facility] has all private rooms.    If you have any questions please call Pre-Admission Testing at [phone].  Deductibles and co-payments are collected on the day of service. Please be prepared to pay the required co-pay, deductible or deposit on the day of service as defined by your plan.

## 2017-08-15 DIAGNOSIS — Z30.41 ENCOUNTER FOR SURVEILLANCE OF CONTRACEPTIVE PILLS: ICD-10-CM

## 2017-08-15 RX ORDER — DROSPIRENONE AND ETHINYL ESTRADIOL 0.03MG-3MG
1 KIT ORAL DAILY
Qty: 90 TABLET | Refills: 0 | Status: SHIPPED | OUTPATIENT
Start: 2017-08-15 | End: 2017-10-23 | Stop reason: SDUPTHER

## 2017-09-13 ENCOUNTER — DOCUMENTATION (OUTPATIENT)
Dept: PHYSICAL THERAPY | Facility: CLINIC | Age: 37
End: 2017-09-13

## 2017-09-13 NOTE — PROGRESS NOTES
Discharge Summary  Discharge Summary from Physical Therapy Report      Dates  PT visit: 4/13/17-5/19/17  Number of Visits: 4     Discharge Status of Patient: See Note dated 5/19/17    Goals: Partially Met    Discharge Plan: Pt did not return to PT    Comments N/A    Date of Discharge 9/13/17        Priscila Lara, PT  Physical Therapist

## 2017-10-20 LAB
ALBUMIN SERPL-MCNC: 3.9 G/DL (ref 3.5–5.2)
ALBUMIN/GLOB SERPL: 1.3 G/DL
ALP SERPL-CCNC: 83 U/L (ref 39–117)
ALT SERPL-CCNC: 9 U/L (ref 1–33)
AST SERPL-CCNC: 14 U/L (ref 1–32)
BILIRUB SERPL-MCNC: 0.4 MG/DL (ref 0.1–1.2)
BUN SERPL-MCNC: 18 MG/DL (ref 6–20)
BUN/CREAT SERPL: 24.3 (ref 7–25)
CALCIUM SERPL-MCNC: 9.5 MG/DL (ref 8.6–10.5)
CHLORIDE SERPL-SCNC: 104 MMOL/L (ref 98–107)
CHOLEST SERPL-MCNC: 245 MG/DL (ref 0–200)
CO2 SERPL-SCNC: 23.9 MMOL/L (ref 22–29)
CREAT SERPL-MCNC: 0.74 MG/DL (ref 0.57–1)
GFR SERPLBLD CREATININE-BSD FMLA CKD-EPI: 108 ML/MIN/1.73
GFR SERPLBLD CREATININE-BSD FMLA CKD-EPI: 89 ML/MIN/1.73
GLOBULIN SER CALC-MCNC: 2.9 GM/DL
GLUCOSE SERPL-MCNC: 84 MG/DL (ref 65–99)
HDLC SERPL-MCNC: 83 MG/DL (ref 40–60)
LDLC SERPL CALC-MCNC: 137 MG/DL (ref 0–100)
LDLC/HDLC SERPL: 1.66 {RATIO}
POTASSIUM SERPL-SCNC: 4.7 MMOL/L (ref 3.5–5.2)
PROT SERPL-MCNC: 6.8 G/DL (ref 6–8.5)
SODIUM SERPL-SCNC: 140 MMOL/L (ref 136–145)
TRIGL SERPL-MCNC: 123 MG/DL (ref 0–150)
VLDLC SERPL CALC-MCNC: 24.6 MG/DL (ref 5–40)

## 2017-10-23 DIAGNOSIS — Z30.41 ENCOUNTER FOR SURVEILLANCE OF CONTRACEPTIVE PILLS: ICD-10-CM

## 2017-10-23 RX ORDER — DROSPIRENONE AND ETHINYL ESTRADIOL 0.03MG-3MG
1 KIT ORAL DAILY
Qty: 90 TABLET | Refills: 0 | Status: SHIPPED | OUTPATIENT
Start: 2017-10-23 | End: 2017-10-26 | Stop reason: SDUPTHER

## 2017-10-24 ENCOUNTER — OFFICE VISIT (OUTPATIENT)
Dept: INTERNAL MEDICINE | Facility: CLINIC | Age: 37
End: 2017-10-24

## 2017-10-24 VITALS
SYSTOLIC BLOOD PRESSURE: 114 MMHG | HEART RATE: 70 BPM | DIASTOLIC BLOOD PRESSURE: 80 MMHG | WEIGHT: 198 LBS | OXYGEN SATURATION: 97 % | BODY MASS INDEX: 31.82 KG/M2 | HEIGHT: 66 IN

## 2017-10-24 DIAGNOSIS — Z12.4 PAP SMEAR FOR CERVICAL CANCER SCREENING: ICD-10-CM

## 2017-10-24 DIAGNOSIS — E78.5 HYPERLIPIDEMIA, UNSPECIFIED HYPERLIPIDEMIA TYPE: ICD-10-CM

## 2017-10-24 DIAGNOSIS — Z00.00 HEALTH CARE MAINTENANCE: Primary | ICD-10-CM

## 2017-10-24 DIAGNOSIS — Z12.31 VISIT FOR SCREENING MAMMOGRAM: ICD-10-CM

## 2017-10-24 DIAGNOSIS — Z23 NEED FOR INFLUENZA VACCINATION: ICD-10-CM

## 2017-10-24 DIAGNOSIS — Z80.3 FAMILY HISTORY OF BREAST CANCER: ICD-10-CM

## 2017-10-24 DIAGNOSIS — K21.9 GASTROESOPHAGEAL REFLUX DISEASE, ESOPHAGITIS PRESENCE NOT SPECIFIED: ICD-10-CM

## 2017-10-24 DIAGNOSIS — L65.9 HAIR THINNING: ICD-10-CM

## 2017-10-24 PROCEDURE — 90686 IIV4 VACC NO PRSV 0.5 ML IM: CPT | Performed by: NURSE PRACTITIONER

## 2017-10-24 PROCEDURE — 99395 PREV VISIT EST AGE 18-39: CPT | Performed by: NURSE PRACTITIONER

## 2017-10-24 PROCEDURE — 90471 IMMUNIZATION ADMIN: CPT | Performed by: NURSE PRACTITIONER

## 2017-10-24 RX ORDER — MELATONIN
1000 2 TIMES DAILY
COMMUNITY

## 2017-10-24 RX ORDER — TRAMADOL HYDROCHLORIDE 50 MG/1
TABLET ORAL
COMMUNITY
Start: 2017-07-17 | End: 2017-10-24

## 2017-10-24 RX ORDER — IBUPROFEN 200 MG
400 TABLET ORAL EVERY 6 HOURS PRN
Qty: 60 TABLET | Refills: 0
Start: 2017-10-24

## 2017-10-24 NOTE — PROGRESS NOTES
Julia Allred is a 37 y.o. female who is here for her yearly CPE and Pap.     History of Present Illness   The patient is here for CPE, Pap and lab work follow-up.  Doing ok other than gaining wt. Cardio is prohibited, can walk or use recumbant bike, just released for this. Up 15 lbs with all of this.     GERD- not covered with omeprazole 20 mg daily. No diff swallowing.     Off Qsymia since shoulder surgery August 2015. Still in PT 2 days a week.     Auscus pap 2/2016, recheck 6/2016 WNL.     Dtr in 8 th grade, younger dtr is 3 rd grade  ,The following portions of the patient's history were reviewed and updated as appropriate: allergies, current medications, past family history, past medical history, past social history, past surgical history and problem list.    Review of Systems   Constitutional: Positive for fatigue (not sleeping well). Negative for chills and fever.   HENT: Positive for ear pain (intermittent). Negative for rhinorrhea and sinus pain.    Eyes: Negative.    Respiratory: Negative.    Cardiovascular: Negative.    Gastrointestinal: Negative.    Endocrine: Negative.    Genitourinary: Negative.    Musculoskeletal: Negative.    Skin: Negative.    Allergic/Immunologic: Negative.    Neurological: Positive for headaches (intermittent, sinus). Negative for dizziness, tremors, seizures, syncope, speech difficulty and weakness.   Hematological: Negative.    Psychiatric/Behavioral: Positive for sleep disturbance (shoulder pain). Negative for dysphoric mood and suicidal ideas. The patient is nervous/anxious (managed).    All other systems reviewed and are negative.      Objective   Physical Exam   Constitutional: She is oriented to person, place, and time. Vital signs are normal. She appears well-developed and well-nourished.   HENT:   Right Ear: Hearing, tympanic membrane, external ear and ear canal normal.   Left Ear: Hearing, tympanic membrane, external ear and ear canal normal.   Nose:  Mucosal edema present.   Mouth/Throat: Uvula is midline, oropharynx is clear and moist and mucous membranes are normal.   Eyes: Conjunctivae, EOM and lids are normal. Pupils are equal, round, and reactive to light.   Neck: Normal range of motion. Neck supple. Normal carotid pulses present. Carotid bruit is not present. No thyromegaly present.   Cardiovascular: Normal rate, regular rhythm, normal heart sounds and intact distal pulses.    Pulmonary/Chest: Effort normal and breath sounds normal. She exhibits no mass, no tenderness, no laceration, no crepitus, no edema, no deformity, no swelling and no retraction. Right breast exhibits no inverted nipple, no mass, no nipple discharge, no skin change and no tenderness. Left breast exhibits no inverted nipple, no mass, no nipple discharge, no skin change and no tenderness. Breasts are symmetrical. There is no breast swelling.   Abdominal: Soft. Normal appearance, normal aorta and bowel sounds are normal. There is no hepatosplenomegaly. There is no tenderness.   Genitourinary: Vagina normal and uterus normal. No breast tenderness, discharge or bleeding. Pelvic exam was performed with patient supine. No labial fusion. There is no rash, tenderness, lesion or injury on the right labia. There is no rash, tenderness, lesion or injury on the left labia. Cervix exhibits no motion tenderness, no discharge and no friability. Right adnexum displays no mass, no tenderness and no fullness. Left adnexum displays no mass, no tenderness and no fullness.   Musculoskeletal: Normal range of motion.   Lymphadenopathy:     She has no cervical adenopathy.        Right: No inguinal and no supraclavicular adenopathy present.        Left: No inguinal and no supraclavicular adenopathy present.   Neurological: She is alert and oriented to person, place, and time. She has normal strength. No cranial nerve deficit or sensory deficit.   Reflex Scores:       Patellar reflexes are 2+ on the right side  and 2+ on the left side.  Skin: Skin is warm, dry and intact.   Psychiatric: She has a normal mood and affect. Her speech is normal and behavior is normal. Judgment and thought content normal. Cognition and memory are normal.     Vitals:    10/24/17 0959   BP: 114/80   Pulse: 70   SpO2: 97%     Appointment on 07/17/2017   Component Date Value Ref Range Status   • Glucose 07/17/2017 91  65 - 99 mg/dL Final   • BUN 07/17/2017 12  6 - 20 mg/dL Final   • Creatinine 07/17/2017 0.79  0.57 - 1.00 mg/dL Final   • Sodium 07/17/2017 140  136 - 145 mmol/L Final   • Potassium 07/17/2017 3.7  3.5 - 5.2 mmol/L Final   • Chloride 07/17/2017 105  98 - 107 mmol/L Final   • CO2 07/17/2017 24.6  22.0 - 29.0 mmol/L Final   • Calcium 07/17/2017 9.5  8.6 - 10.5 mg/dL Final   • Total Protein 07/17/2017 7.0  6.0 - 8.5 g/dL Final   • Albumin 07/17/2017 3.80  3.50 - 5.20 g/dL Final   • ALT (SGPT) 07/17/2017 13  1 - 33 U/L Final   • AST (SGOT) 07/17/2017 12  1 - 32 U/L Final   • Alkaline Phosphatase 07/17/2017 74  39 - 117 U/L Final   • Total Bilirubin 07/17/2017 0.6  0.1 - 1.2 mg/dL Final   • eGFR Non African Amer 07/17/2017 83  >60 mL/min/1.73 Final   • Globulin 07/17/2017 3.2  gm/dL Final   • A/G Ratio 07/17/2017 1.2  g/dL Final   • BUN/Creatinine Ratio 07/17/2017 15.2  7.0 - 25.0 Final   • Anion Gap 07/17/2017 10.4  mmol/L Final   • HCG Qualitative 07/17/2017 Negative  Indeterminate, Negative Final   • WBC 07/17/2017 9.74  4.50 - 10.70 10*3/mm3 Final   • RBC 07/17/2017 4.48  3.90 - 5.20 10*6/mm3 Final   • Hemoglobin 07/17/2017 13.4  11.9 - 15.5 g/dL Final   • Hematocrit 07/17/2017 40.3  35.6 - 45.5 % Final   • MCV 07/17/2017 90.0  80.5 - 98.2 fL Final   • MCH 07/17/2017 29.9  26.9 - 32.0 pg Final   • MCHC 07/17/2017 33.3  32.4 - 36.3 g/dL Final   • RDW 07/17/2017 13.3* 11.7 - 13.0 % Final   • RDW-SD 07/17/2017 44.0  37.0 - 54.0 fl Final   • MPV 07/17/2017 10.1  6.0 - 12.0 fL Final   • Platelets 07/17/2017 301  140 - 500 10*3/mm3 Final   •  Neutrophil % 07/17/2017 70.9  42.7 - 76.0 % Final   • Lymphocyte % 07/17/2017 19.0* 19.6 - 45.3 % Final   • Monocyte % 07/17/2017 7.1  5.0 - 12.0 % Final   • Eosinophil % 07/17/2017 2.1  0.3 - 6.2 % Final   • Basophil % 07/17/2017 0.7  0.0 - 1.5 % Final   • Immature Grans % 07/17/2017 0.2  0.0 - 0.5 % Final   • Neutrophils, Absolute 07/17/2017 6.91  1.90 - 8.10 10*3/mm3 Final   • Lymphocytes, Absolute 07/17/2017 1.85  0.90 - 4.80 10*3/mm3 Final   • Monocytes, Absolute 07/17/2017 0.69  0.20 - 1.20 10*3/mm3 Final   • Eosinophils, Absolute 07/17/2017 0.20  0.00 - 0.70 10*3/mm3 Final   • Basophils, Absolute 07/17/2017 0.07  0.00 - 0.20 10*3/mm3 Final   • Immature Grans, Absolute 07/17/2017 0.02  0.00 - 0.03 10*3/mm3 Final     Current Outpatient Prescriptions:   •  cholecalciferol (VITAMIN D3) 1000 units tablet, Take 1,000 Units by mouth 2 (Two) Times a Day., Disp: , Rfl:   •  MULTIPLE VITAMINS ESSENTIAL PO, Take 1 tablet by mouth Daily., Disp: , Rfl:   •  naproxen sodium (ANAPROX) 550 MG tablet, Take 550 mg by mouth Daily As Needed. Stop for sx, Disp: , Rfl:   •  omeprazole (PRILOSEC) 20 MG capsule, Take 1 capsule by mouth Daily., Disp: 30 capsule, Rfl: 2  •  valsartan (DIOVAN) 80 MG tablet, Take 80 mg by mouth Daily., Disp: , Rfl:   •  OJ 28 3-0.03 MG per tablet, Take 1 tablet by mouth Daily., Disp: 90 tablet, Rfl: 0  Assessment/Plan   There are no diagnoses linked to this encounter.    1. HCM- continue to work on wt loss  2. HPL- mild, work on mediterranean diet, slowly increase activity  3. GERD- will increase omeprazole to BID, probably due to NSAIDs  4. Family hx of breast ca- check screening mammo  5. Pap smear- completed, hx of ascus pap 2/2016  6. Hair thinning- she will discuss with derm    Flu vaccine- 10/24/2017  HPV vaccine- UTD  Dentist UTD

## 2017-10-26 DIAGNOSIS — Z30.41 ENCOUNTER FOR SURVEILLANCE OF CONTRACEPTIVE PILLS: ICD-10-CM

## 2017-10-26 RX ORDER — DROSPIRENONE AND ETHINYL ESTRADIOL 0.03MG-3MG
1 KIT ORAL DAILY
Qty: 90 TABLET | Refills: 3 | Status: SHIPPED | OUTPATIENT
Start: 2017-10-26 | End: 2018-10-24 | Stop reason: SDUPTHER

## 2017-10-27 ENCOUNTER — TRANSCRIBE ORDERS (OUTPATIENT)
Dept: ADMINISTRATIVE | Facility: HOSPITAL | Age: 37
End: 2017-10-27

## 2017-10-27 DIAGNOSIS — Z12.39 SCREENING BREAST EXAMINATION: Primary | ICD-10-CM

## 2017-10-27 LAB
CYTOLOGIST CVX/VAG CYTO: NORMAL
CYTOLOGY CVX/VAG DOC THIN PREP: NORMAL
DX ICD CODE: NORMAL
HIV 1 & 2 AB SER-IMP: NORMAL
HPV I/H RISK 1 DNA CVX QL PROBE+SIG AMP: NEGATIVE
Lab: NORMAL
Lab: NORMAL
OTHER STN SPEC: NORMAL
PATH REPORT.FINAL DX SPEC: NORMAL
STAT OF ADQ CVX/VAG CYTO-IMP: NORMAL

## 2017-11-09 ENCOUNTER — TELEPHONE (OUTPATIENT)
Dept: INTERNAL MEDICINE | Facility: CLINIC | Age: 37
End: 2017-11-09

## 2017-11-09 DIAGNOSIS — K21.9 GASTROESOPHAGEAL REFLUX DISEASE, ESOPHAGITIS PRESENCE NOT SPECIFIED: Primary | ICD-10-CM

## 2017-11-09 RX ORDER — PANTOPRAZOLE SODIUM 20 MG/1
20 TABLET, DELAYED RELEASE ORAL DAILY
Qty: 30 TABLET | Refills: 2 | Status: SHIPPED | OUTPATIENT
Start: 2017-11-09 | End: 2017-11-22 | Stop reason: SDUPTHER

## 2017-11-09 NOTE — TELEPHONE ENCOUNTER
----- Message from Brandi Denton sent at 11/9/2017  9:47 AM EST -----  PT SAID HER OMEPRAZOLE ISN'T WORKING AND NATALI TOLD HER IF TAKING TWO DOESN'T WORK TO CALL AND WE WOULD SEND IN A PRESCRIPTION STRENGTH ANTACID

## 2017-11-09 NOTE — TELEPHONE ENCOUNTER
PT HAS TRIED TAKING NEXIUM 20MG DAILY AND THEN TRIED TAKING NEXIUM 40MG DAILY AND SHE IS STILL HAVING REFLUX, ITS WAKING HER UP AT NIGHT AND CAME THROUGH HER NOSE LAST NIGHT.

## 2017-11-10 ENCOUNTER — TELEPHONE (OUTPATIENT)
Dept: INTERNAL MEDICINE | Facility: CLINIC | Age: 37
End: 2017-11-10

## 2017-11-22 ENCOUNTER — OFFICE VISIT (OUTPATIENT)
Dept: GASTROENTEROLOGY | Facility: CLINIC | Age: 37
End: 2017-11-22

## 2017-11-22 VITALS
SYSTOLIC BLOOD PRESSURE: 122 MMHG | WEIGHT: 207.2 LBS | TEMPERATURE: 97.5 F | BODY MASS INDEX: 33.3 KG/M2 | HEIGHT: 66 IN | DIASTOLIC BLOOD PRESSURE: 82 MMHG

## 2017-11-22 DIAGNOSIS — Z98.84 LAP-BAND SURGERY STATUS: ICD-10-CM

## 2017-11-22 DIAGNOSIS — K21.9 GASTROESOPHAGEAL REFLUX DISEASE, ESOPHAGITIS PRESENCE NOT SPECIFIED: Primary | ICD-10-CM

## 2017-11-22 PROCEDURE — 99204 OFFICE O/P NEW MOD 45 MIN: CPT | Performed by: INTERNAL MEDICINE

## 2017-11-22 RX ORDER — SODIUM CHLORIDE, SODIUM LACTATE, POTASSIUM CHLORIDE, CALCIUM CHLORIDE 600; 310; 30; 20 MG/100ML; MG/100ML; MG/100ML; MG/100ML
30 INJECTION, SOLUTION INTRAVENOUS CONTINUOUS
Status: CANCELLED | OUTPATIENT
Start: 2017-11-22

## 2017-11-22 RX ORDER — PANTOPRAZOLE SODIUM 20 MG/1
20 TABLET, DELAYED RELEASE ORAL 2 TIMES DAILY
Qty: 60 TABLET | Refills: 2 | Status: SHIPPED | OUTPATIENT
Start: 2017-11-22 | End: 2017-12-22

## 2017-11-22 RX ORDER — SODIUM CHLORIDE, SODIUM LACTATE, POTASSIUM CHLORIDE, CALCIUM CHLORIDE 600; 310; 30; 20 MG/100ML; MG/100ML; MG/100ML; MG/100ML
30 INJECTION, SOLUTION INTRAVENOUS CONTINUOUS
Status: CANCELLED | OUTPATIENT
Start: 2017-12-13

## 2017-11-22 NOTE — PATIENT INSTRUCTIONS
Schedule the EGD    Start the pantoprazole twice a day-- before breakfast and before dinner    For any additional questions, concerns or changes to your condition after today's office visit please contact the office at [phone].

## 2017-11-22 NOTE — PROGRESS NOTES
Chief Complaint   Patient presents with   • Heartburn       Subjective     HPI    Mirian Allred is a 37 y.o. female with a past medical history noted below who presents for evaluation of GERD.  Symptoms started July-August in the setting of excess NSAIDs for a shoulder injury.  She was taking 800mg ibuprofen TID and then changed to diclofenac twice a day.  She developed burning gastric pain. She would eat crackers to try to relieve it.  She stopped these meds before her surgery and started OTC ppi meds.  This did relieve the burning gastric pain.  However, following her surgery, she developed acid reflux. Symptoms are nocturnal. She will get acidic reflux into her throat and even had an episode of burning acidic reflux coming out her nose.  She was started on 20mg pantoprazole which she has been taking nightly.  She unfortunately is still getting symptoms at night.  She has no associated nausea, vomiting, change in her bowel habits, abdominal pain.    She is no longer taking NSAIDs.  She does have a lap band for at least 10 years ago.  She has had significant weight gain following her surgery that she attributes to decreased activity.    No smoking.  Occasiona ETOH.  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    Mother with colon polyps (before 60 years), great maternal grandmother with colon cancer.        Past Medical History:   Diagnosis Date   • Anxiety    • Dysmenorrhea    • Encounter for routine gynecological examination with Papanicolaou smear of cervix    • Endometriosis    • H/O contraceptive use    • High risk medication use    • Hypertension    • Lipoma of skin and subcutaneous tissue    • Onychomycosis    • Pap smear for cervical cancer screening    • PONV (postoperative nausea and vomiting)    • Pregnancy     2 live births,  2   • Vitamin D deficiency disease    • Well woman exam with routine gynecological exam          Current Outpatient Prescriptions:   •  cholecalciferol (VITAMIN D3) 1000  units tablet, Take 1,000 Units by mouth 2 (Two) Times a Day., Disp: , Rfl:   •  ibuprofen (ADVIL,MOTRIN) 200 MG tablet, Take 2 tablets by mouth Every 6 (Six) Hours As Needed for Mild Pain ., Disp: 60 tablet, Rfl: 0  •  MULTIPLE VITAMINS ESSENTIAL PO, Take 1 tablet by mouth Daily., Disp: , Rfl:   •  pantoprazole (PROTONIX) 20 MG EC tablet, Take 1 tablet by mouth 2 (Two) Times a Day for 30 days., Disp: 60 tablet, Rfl: 2  •  valsartan (DIOVAN) 80 MG tablet, Take 80 mg by mouth Daily., Disp: , Rfl:   •  OJ 28 3-0.03 MG per tablet, Take 1 tablet by mouth Daily., Disp: 90 tablet, Rfl: 3    Allergies   Allergen Reactions   • Bee Venom    • Ciprofloxacin Hives   • Penicillins Hives and Itching   • Scopolamine Other (See Comments)       Social History     Social History   • Marital status:      Spouse name: PAULO   • Number of children: 2   • Years of education: N/A     Occupational History   • 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     Social History Main Topics   • Smoking status: Never Smoker   • Smokeless tobacco: Never Used   • Alcohol use Yes      Comment: occasional   • Drug use: No   • Sexual activity: Yes     Partners: Male     Other Topics Concern   • Not on file     Social History Narrative       Family History   Problem Relation Age of Onset   • Hypertension Mother    • Colon polyps Mother    • Diabetes Father    • Hypertension Father    • Diabetes Paternal Grandmother    • Stroke Paternal Grandfather    • Hyperlipidemia Other    • Hypertension Other    • Lymphoma Maternal Grandfather      76 yrs old   • Breast cancer Paternal Aunt       due to this, in her 50s. Great aunt   • Malig Hyperthermia Neg Hx        Review of Systems   Constitutional: Negative for activity change, appetite change and fatigue.   HENT: Negative for congestion, sore throat and trouble swallowing.    Respiratory: Negative.    Cardiovascular: Negative.    Gastrointestinal: Negative for abdominal distention, abdominal pain, blood in  stool, constipation, diarrhea, nausea and vomiting.        +GERD   Endocrine: Negative for cold intolerance and heat intolerance.   Genitourinary: Negative for difficulty urinating, dysuria and frequency.   Musculoskeletal: Negative for arthralgias, back pain and myalgias.   Skin: Negative.    Hematological: Negative for adenopathy. Does not bruise/bleed easily.   All other systems reviewed and are negative.      Objective     Vitals:    11/22/17 1313   BP: 122/82   Temp: 97.5 °F (36.4 °C)     Last 2 weights    11/22/17  1313   Weight: 207 lb 3.2 oz (94 kg)     Body mass index is 33.44 kg/(m^2).    Physical Exam   Constitutional: She is oriented to person, place, and time. She appears well-developed and well-nourished. No distress.   HENT:   Head: Normocephalic and atraumatic.   Right Ear: External ear normal.   Left Ear: External ear normal.   Nose: Nose normal.   Mouth/Throat: Oropharynx is clear and moist.   Eyes: Conjunctivae and EOM are normal. Right eye exhibits no discharge. Left eye exhibits no discharge. No scleral icterus.   Neck: Normal range of motion. Neck supple. No thyromegaly present.   No supraclavicular adenopathy   Cardiovascular: Normal rate, regular rhythm, normal heart sounds and intact distal pulses.  Exam reveals no gallop.    No murmur heard.  No lower extremity edema   Pulmonary/Chest: Effort normal and breath sounds normal. No respiratory distress. She has no wheezes.   Abdominal: Soft. Normal appearance and bowel sounds are normal. She exhibits no distension and no mass. There is no hepatosplenomegaly. There is no tenderness. There is no rigidity, no rebound and no guarding.   Genitourinary:   Genitourinary Comments: Rectal exam deferred   Musculoskeletal: Normal range of motion. She exhibits no edema or tenderness.   No atrophy of upper or lower extremities.  Normal digits and nails of both hands.   Lymphadenopathy:     She has no cervical adenopathy.   Neurological: She is alert and  oriented to person, place, and time. She displays no atrophy. Coordination normal.   Skin: Skin is warm and dry. No rash noted. She is not diaphoretic. No erythema.   Psychiatric: She has a normal mood and affect. Her behavior is normal. Judgment and thought content normal.   Vitals reviewed.      WBC   Date Value Ref Range Status   07/17/2017 9.74 4.50 - 10.70 10*3/mm3 Final     RBC   Date Value Ref Range Status   07/17/2017 4.48 3.90 - 5.20 10*6/mm3 Final     Hemoglobin   Date Value Ref Range Status   07/17/2017 13.4 11.9 - 15.5 g/dL Final     Hematocrit   Date Value Ref Range Status   07/17/2017 40.3 35.6 - 45.5 % Final     MCV   Date Value Ref Range Status   07/17/2017 90.0 80.5 - 98.2 fL Final     MCH   Date Value Ref Range Status   07/17/2017 29.9 26.9 - 32.0 pg Final     MCHC   Date Value Ref Range Status   07/17/2017 33.3 32.4 - 36.3 g/dL Final     RDW   Date Value Ref Range Status   07/17/2017 13.3 (H) 11.7 - 13.0 % Final     RDW-SD   Date Value Ref Range Status   07/17/2017 44.0 37.0 - 54.0 fl Final     MPV   Date Value Ref Range Status   07/17/2017 10.1 6.0 - 12.0 fL Final     Platelets   Date Value Ref Range Status   07/17/2017 301 140 - 500 10*3/mm3 Final     Neutrophil %   Date Value Ref Range Status   07/17/2017 70.9 42.7 - 76.0 % Final     Lymphocyte %   Date Value Ref Range Status   07/17/2017 19.0 (L) 19.6 - 45.3 % Final     Monocyte %   Date Value Ref Range Status   07/17/2017 7.1 5.0 - 12.0 % Final     Eosinophil %   Date Value Ref Range Status   07/17/2017 2.1 0.3 - 6.2 % Final     Basophil %   Date Value Ref Range Status   07/17/2017 0.7 0.0 - 1.5 % Final     Immature Grans %   Date Value Ref Range Status   07/17/2017 0.2 0.0 - 0.5 % Final     Neutrophils, Absolute   Date Value Ref Range Status   07/17/2017 6.91 1.90 - 8.10 10*3/mm3 Final     Lymphocytes, Absolute   Date Value Ref Range Status   07/17/2017 1.85 0.90 - 4.80 10*3/mm3 Final     Monocytes, Absolute   Date Value Ref Range Status    07/17/2017 0.69 0.20 - 1.20 10*3/mm3 Final     Eosinophils, Absolute   Date Value Ref Range Status   07/17/2017 0.20 0.00 - 0.70 10*3/mm3 Final     Basophils, Absolute   Date Value Ref Range Status   07/17/2017 0.07 0.00 - 0.20 10*3/mm3 Final     Immature Grans, Absolute   Date Value Ref Range Status   07/17/2017 0.02 0.00 - 0.03 10*3/mm3 Final       Glucose   Date Value Ref Range Status   07/17/2017 91 65 - 99 mg/dL Final     Sodium   Date Value Ref Range Status   10/20/2017 140 136 - 145 mmol/L Final   07/17/2017 140 136 - 145 mmol/L Final     Potassium   Date Value Ref Range Status   10/20/2017 4.7 3.5 - 5.2 mmol/L Final   07/17/2017 3.7 3.5 - 5.2 mmol/L Final     CO2   Date Value Ref Range Status   07/17/2017 24.6 22.0 - 29.0 mmol/L Final     Total CO2   Date Value Ref Range Status   10/20/2017 23.9 22.0 - 29.0 mmol/L Final     Chloride   Date Value Ref Range Status   10/20/2017 104 98 - 107 mmol/L Final   07/17/2017 105 98 - 107 mmol/L Final     Anion Gap   Date Value Ref Range Status   07/17/2017 10.4 mmol/L Final     Creatinine   Date Value Ref Range Status   10/20/2017 0.74 0.57 - 1.00 mg/dL Final   07/17/2017 0.79 0.57 - 1.00 mg/dL Final   06/29/2017 1.00 0.60 - 1.30 mg/dL Final     Comment:     Serial Number: [id number]    : [id number]     BUN   Date Value Ref Range Status   10/20/2017 18 6 - 20 mg/dL Final   07/17/2017 12 6 - 20 mg/dL Final     BUN/Creatinine Ratio   Date Value Ref Range Status   10/20/2017 24.3 7.0 - 25.0 Final   07/17/2017 15.2 7.0 - 25.0 Final     Calcium   Date Value Ref Range Status   10/20/2017 9.5 8.6 - 10.5 mg/dL Final   07/17/2017 9.5 8.6 - 10.5 mg/dL Final     eGFR Non  Amer   Date Value Ref Range Status   07/17/2017 83 >60 mL/min/1.73 Final     eGFR Non  Am   Date Value Ref Range Status   10/20/2017 89 >60 mL/min/1.73 Final     Alkaline Phosphatase   Date Value Ref Range Status   10/20/2017 83 39 - 117 U/L Final   07/17/2017 74 39 - 117 U/L Final     Total  Protein   Date Value Ref Range Status   07/17/2017 7.0 6.0 - 8.5 g/dL Final     ALT (SGPT)   Date Value Ref Range Status   10/20/2017 9 1 - 33 U/L Final   07/17/2017 13 1 - 33 U/L Final     AST (SGOT)   Date Value Ref Range Status   10/20/2017 14 1 - 32 U/L Final   07/17/2017 12 1 - 32 U/L Final     Total Bilirubin   Date Value Ref Range Status   10/20/2017 0.4 0.1 - 1.2 mg/dL Final   07/17/2017 0.6 0.1 - 1.2 mg/dL Final     Albumin   Date Value Ref Range Status   10/20/2017 3.90 3.50 - 5.20 g/dL Final   07/17/2017 3.80 3.50 - 5.20 g/dL Final     Globulin   Date Value Ref Range Status   07/17/2017 3.2 gm/dL Final     A/G Ratio   Date Value Ref Range Status   10/20/2017 1.3 g/dL Final   07/17/2017 1.2 g/dL Final         Imaging Results (last 7 days)     ** No results found for the last 168 hours. **            No notes on file    Assessment/Plan    1. GERD: setting of excess nsaid use but now persistent despite ppi.  ? Due to lap band/herniation?  She has also gained 24# in 4 months which could be contributing    2. Lap band: placed greater than 10 years ago.  ? The cause to her symptoms    3. Family history of colon polyps: in her mother before 60, she will be due for colonoscopy at age 41    Plan  -I will have her increase the pantoprazole to twice a day  -EGD for further evaluation of her symptoms and to assess the status of her lap band; this may be that she is having herniation or some other esophagitis from this    Mirian was seen today for heartburn.    Diagnoses and all orders for this visit:    Gastroesophageal reflux disease, esophagitis presence not specified  -     Case Request; Standing  -     Implement Anesthesia Orders Day of Procedure; Standing  -     Obtain Informed Consent; Standing  -     lactated ringers infusion; Infuse 30 mL/hr into a venous catheter Continuous.  -     Case Request  -     Case Request; Standing  -     Implement Anesthesia Orders Day of Procedure; Standing  -     Obtain Informed  Consent; Standing  -     lactated ringers infusion; Infuse 30 mL/hr into a venous catheter Continuous.  -     Case Request  -     pantoprazole (PROTONIX) 20 MG EC tablet; Take 1 tablet by mouth 2 (Two) Times a Day for 30 days.    LAP-BAND surgery status        I have discussed the above plan with the patient.  They verbalize understanding and are in agreement with the plan.  They have been advised to contact the office for any questions, concerns, or changes related to their health.    Dictated utilizing Dragon dictation

## 2017-11-28 ENCOUNTER — HOSPITAL ENCOUNTER (OUTPATIENT)
Dept: MAMMOGRAPHY | Facility: HOSPITAL | Age: 37
Discharge: HOME | End: 2017-11-28
Admitting: NURSE PRACTITIONER

## 2017-11-28 DIAGNOSIS — Z12.39 SCREENING BREAST EXAMINATION: ICD-10-CM

## 2017-11-28 PROCEDURE — G0202 SCR MAMMO BI INCL CAD: HCPCS

## 2017-11-28 PROCEDURE — 77063 BREAST TOMOSYNTHESIS BI: CPT

## 2017-12-12 RX ORDER — DOXYCYCLINE HYCLATE 100 MG/1
100 CAPSULE ORAL 2 TIMES DAILY
COMMUNITY
Start: 2017-12-05 | End: 2017-12-16

## 2017-12-13 ENCOUNTER — HOSPITAL ENCOUNTER (OUTPATIENT)
Facility: HOSPITAL | Age: 37
Setting detail: HOSPITAL OUTPATIENT SURGERY
Discharge: HOME | End: 2017-12-13
Attending: INTERNAL MEDICINE | Admitting: INTERNAL MEDICINE

## 2017-12-13 ENCOUNTER — ANESTHESIA (OUTPATIENT)
Dept: GASTROENTEROLOGY | Facility: HOSPITAL | Age: 37
End: 2017-12-13

## 2017-12-13 ENCOUNTER — ANESTHESIA EVENT (OUTPATIENT)
Dept: GASTROENTEROLOGY | Facility: HOSPITAL | Age: 37
End: 2017-12-13

## 2017-12-13 VITALS
TEMPERATURE: 98.1 F | HEART RATE: 89 BPM | WEIGHT: 205.6 LBS | DIASTOLIC BLOOD PRESSURE: 99 MMHG | RESPIRATION RATE: 18 BRPM | OXYGEN SATURATION: 100 % | HEIGHT: 67 IN | BODY MASS INDEX: 32.27 KG/M2 | SYSTOLIC BLOOD PRESSURE: 122 MMHG

## 2017-12-13 DIAGNOSIS — K21.9 GASTROESOPHAGEAL REFLUX DISEASE, ESOPHAGITIS PRESENCE NOT SPECIFIED: ICD-10-CM

## 2017-12-13 LAB
B-HCG UR QL: NEGATIVE
INTERNAL NEGATIVE CONTROL: NEGATIVE
INTERNAL POSITIVE CONTROL: POSITIVE
Lab: NORMAL

## 2017-12-13 PROCEDURE — 88305 TISSUE EXAM BY PATHOLOGIST: CPT | Performed by: INTERNAL MEDICINE

## 2017-12-13 PROCEDURE — 25010000002 PROPOFOL 10 MG/ML EMULSION: Performed by: ANESTHESIOLOGY

## 2017-12-13 PROCEDURE — 43239 EGD BIOPSY SINGLE/MULTIPLE: CPT | Performed by: INTERNAL MEDICINE

## 2017-12-13 PROCEDURE — 88312 SPECIAL STAINS GROUP 1: CPT | Performed by: INTERNAL MEDICINE

## 2017-12-13 RX ORDER — FLUTICASONE PROPIONATE 50 MCG
2 SPRAY, SUSPENSION (ML) NASAL DAILY
COMMUNITY

## 2017-12-13 RX ORDER — PROPOFOL 10 MG/ML
VIAL (ML) INTRAVENOUS CONTINUOUS PRN
Status: DISCONTINUED | OUTPATIENT
Start: 2017-12-13 | End: 2017-12-13 | Stop reason: SURG

## 2017-12-13 RX ORDER — SODIUM CHLORIDE, SODIUM LACTATE, POTASSIUM CHLORIDE, CALCIUM CHLORIDE 600; 310; 30; 20 MG/100ML; MG/100ML; MG/100ML; MG/100ML
30 INJECTION, SOLUTION INTRAVENOUS CONTINUOUS
Status: DISCONTINUED | OUTPATIENT
Start: 2017-12-13 | End: 2017-12-13 | Stop reason: HOSPADM

## 2017-12-13 RX ORDER — SODIUM CHLORIDE 0.9 % (FLUSH) 0.9 %
1-10 SYRINGE (ML) INJECTION AS NEEDED
Status: DISCONTINUED | OUTPATIENT
Start: 2017-12-13 | End: 2017-12-13 | Stop reason: HOSPADM

## 2017-12-13 RX ORDER — LIDOCAINE HYDROCHLORIDE 20 MG/ML
INJECTION, SOLUTION INFILTRATION; PERINEURAL AS NEEDED
Status: DISCONTINUED | OUTPATIENT
Start: 2017-12-13 | End: 2017-12-13 | Stop reason: SURG

## 2017-12-13 RX ORDER — PROPOFOL 10 MG/ML
VIAL (ML) INTRAVENOUS AS NEEDED
Status: DISCONTINUED | OUTPATIENT
Start: 2017-12-13 | End: 2017-12-13 | Stop reason: SURG

## 2017-12-13 RX ADMIN — PROPOFOL 100 MG: 10 INJECTION, EMULSION INTRAVENOUS at 10:33

## 2017-12-13 RX ADMIN — SODIUM CHLORIDE, POTASSIUM CHLORIDE, SODIUM LACTATE AND CALCIUM CHLORIDE 30 ML/HR: 600; 310; 30; 20 INJECTION, SOLUTION INTRAVENOUS at 08:48

## 2017-12-13 RX ADMIN — PROPOFOL 100 MCG/KG/MIN: 10 INJECTION, EMULSION INTRAVENOUS at 10:33

## 2017-12-13 RX ADMIN — LIDOCAINE HYDROCHLORIDE 60 MG: 20 INJECTION, SOLUTION INFILTRATION; PERINEURAL at 10:33

## 2017-12-13 NOTE — PLAN OF CARE
Problem: Patient Care Overview (Adult)  Goal: Plan of Care Review  Outcome: Ongoing (interventions implemented as appropriate)    12/13/17 0826   Coping/Psychosocial Response Interventions   Plan Of Care Reviewed With patient   Patient Care Overview   Progress no change       Goal: Adult Individualization and Mutuality  Outcome: Ongoing (interventions implemented as appropriate)    12/13/17 0826   Individualization   Patient Specific Preferences none       Goal: Discharge Needs Assessment  Outcome: Ongoing (interventions implemented as appropriate)    12/13/17 0826   Discharge Needs Assessment   Concerns To Be Addressed no discharge needs identified   Living Environment   Transportation Available family or friend will provide         Problem: GI Endoscopy (Adult)  Goal: Signs and Symptoms of Listed Potential Problems Will be Absent or Manageable (GI Endoscopy)  Outcome: Ongoing (interventions implemented as appropriate)    12/13/17 0826   GI Endoscopy   Problems Present (GI Endoscopy) none

## 2017-12-13 NOTE — H&P (VIEW-ONLY)
Chief Complaint   Patient presents with   • Heartburn       Subjective     HPI    Mirian Allred is a 37 y.o. female with a past medical history noted below who presents for evaluation of GERD.  Symptoms started July-August in the setting of excess NSAIDs for a shoulder injury.  She was taking 800mg ibuprofen TID and then changed to diclofenac twice a day.  She developed burning gastric pain. She would eat crackers to try to relieve it.  She stopped these meds before her surgery and started OTC ppi meds.  This did relieve the burning gastric pain.  However, following her surgery, she developed acid reflux. Symptoms are nocturnal. She will get acidic reflux into her throat and even had an episode of burning acidic reflux coming out her nose.  She was started on 20mg pantoprazole which she has been taking nightly.  She unfortunately is still getting symptoms at night.  She has no associated nausea, vomiting, change in her bowel habits, abdominal pain.    She is no longer taking NSAIDs.  She does have a lap band for at least 10 years ago.  She has had significant weight gain following her surgery that she attributes to decreased activity.    No smoking.  Occasiona ETOH.  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    Mother with colon polyps (before 60 years), great maternal grandmother with colon cancer.        Past Medical History:   Diagnosis Date   • Anxiety    • Dysmenorrhea    • Encounter for routine gynecological examination with Papanicolaou smear of cervix    • Endometriosis    • H/O contraceptive use    • High risk medication use    • Hypertension    • Lipoma of skin and subcutaneous tissue    • Onychomycosis    • Pap smear for cervical cancer screening    • PONV (postoperative nausea and vomiting)    • Pregnancy     2 live births,  2   • Vitamin D deficiency disease    • Well woman exam with routine gynecological exam          Current Outpatient Prescriptions:   •  cholecalciferol (VITAMIN D3) 1000  units tablet, Take 1,000 Units by mouth 2 (Two) Times a Day., Disp: , Rfl:   •  ibuprofen (ADVIL,MOTRIN) 200 MG tablet, Take 2 tablets by mouth Every 6 (Six) Hours As Needed for Mild Pain ., Disp: 60 tablet, Rfl: 0  •  MULTIPLE VITAMINS ESSENTIAL PO, Take 1 tablet by mouth Daily., Disp: , Rfl:   •  pantoprazole (PROTONIX) 20 MG EC tablet, Take 1 tablet by mouth 2 (Two) Times a Day for 30 days., Disp: 60 tablet, Rfl: 2  •  valsartan (DIOVAN) 80 MG tablet, Take 80 mg by mouth Daily., Disp: , Rfl:   •  OJ 28 3-0.03 MG per tablet, Take 1 tablet by mouth Daily., Disp: 90 tablet, Rfl: 3    Allergies   Allergen Reactions   • Bee Venom    • Ciprofloxacin Hives   • Penicillins Hives and Itching   • Scopolamine Other (See Comments)       Social History     Social History   • Marital status:      Spouse name: PAULO   • Number of children: 2   • Years of education: N/A     Occupational History   • 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     Social History Main Topics   • Smoking status: Never Smoker   • Smokeless tobacco: Never Used   • Alcohol use Yes      Comment: occasional   • Drug use: No   • Sexual activity: Yes     Partners: Male     Other Topics Concern   • Not on file     Social History Narrative       Family History   Problem Relation Age of Onset   • Hypertension Mother    • Colon polyps Mother    • Diabetes Father    • Hypertension Father    • Diabetes Paternal Grandmother    • Stroke Paternal Grandfather    • Hyperlipidemia Other    • Hypertension Other    • Lymphoma Maternal Grandfather      76 yrs old   • Breast cancer Paternal Aunt       due to this, in her 50s. Great aunt   • Malig Hyperthermia Neg Hx        Review of Systems   Constitutional: Negative for activity change, appetite change and fatigue.   HENT: Negative for congestion, sore throat and trouble swallowing.    Respiratory: Negative.    Cardiovascular: Negative.    Gastrointestinal: Negative for abdominal distention, abdominal pain, blood in  stool, constipation, diarrhea, nausea and vomiting.        +GERD   Endocrine: Negative for cold intolerance and heat intolerance.   Genitourinary: Negative for difficulty urinating, dysuria and frequency.   Musculoskeletal: Negative for arthralgias, back pain and myalgias.   Skin: Negative.    Hematological: Negative for adenopathy. Does not bruise/bleed easily.   All other systems reviewed and are negative.      Objective     Vitals:    11/22/17 1313   BP: 122/82   Temp: 97.5 °F (36.4 °C)     Last 2 weights    11/22/17  1313   Weight: 207 lb 3.2 oz (94 kg)     Body mass index is 33.44 kg/(m^2).    Physical Exam   Constitutional: She is oriented to person, place, and time. She appears well-developed and well-nourished. No distress.   HENT:   Head: Normocephalic and atraumatic.   Right Ear: External ear normal.   Left Ear: External ear normal.   Nose: Nose normal.   Mouth/Throat: Oropharynx is clear and moist.   Eyes: Conjunctivae and EOM are normal. Right eye exhibits no discharge. Left eye exhibits no discharge. No scleral icterus.   Neck: Normal range of motion. Neck supple. No thyromegaly present.   No supraclavicular adenopathy   Cardiovascular: Normal rate, regular rhythm, normal heart sounds and intact distal pulses.  Exam reveals no gallop.    No murmur heard.  No lower extremity edema   Pulmonary/Chest: Effort normal and breath sounds normal. No respiratory distress. She has no wheezes.   Abdominal: Soft. Normal appearance and bowel sounds are normal. She exhibits no distension and no mass. There is no hepatosplenomegaly. There is no tenderness. There is no rigidity, no rebound and no guarding.   Genitourinary:   Genitourinary Comments: Rectal exam deferred   Musculoskeletal: Normal range of motion. She exhibits no edema or tenderness.   No atrophy of upper or lower extremities.  Normal digits and nails of both hands.   Lymphadenopathy:     She has no cervical adenopathy.   Neurological: She is alert and  oriented to person, place, and time. She displays no atrophy. Coordination normal.   Skin: Skin is warm and dry. No rash noted. She is not diaphoretic. No erythema.   Psychiatric: She has a normal mood and affect. Her behavior is normal. Judgment and thought content normal.   Vitals reviewed.      WBC   Date Value Ref Range Status   07/17/2017 9.74 4.50 - 10.70 10*3/mm3 Final     RBC   Date Value Ref Range Status   07/17/2017 4.48 3.90 - 5.20 10*6/mm3 Final     Hemoglobin   Date Value Ref Range Status   07/17/2017 13.4 11.9 - 15.5 g/dL Final     Hematocrit   Date Value Ref Range Status   07/17/2017 40.3 35.6 - 45.5 % Final     MCV   Date Value Ref Range Status   07/17/2017 90.0 80.5 - 98.2 fL Final     MCH   Date Value Ref Range Status   07/17/2017 29.9 26.9 - 32.0 pg Final     MCHC   Date Value Ref Range Status   07/17/2017 33.3 32.4 - 36.3 g/dL Final     RDW   Date Value Ref Range Status   07/17/2017 13.3 (H) 11.7 - 13.0 % Final     RDW-SD   Date Value Ref Range Status   07/17/2017 44.0 37.0 - 54.0 fl Final     MPV   Date Value Ref Range Status   07/17/2017 10.1 6.0 - 12.0 fL Final     Platelets   Date Value Ref Range Status   07/17/2017 301 140 - 500 10*3/mm3 Final     Neutrophil %   Date Value Ref Range Status   07/17/2017 70.9 42.7 - 76.0 % Final     Lymphocyte %   Date Value Ref Range Status   07/17/2017 19.0 (L) 19.6 - 45.3 % Final     Monocyte %   Date Value Ref Range Status   07/17/2017 7.1 5.0 - 12.0 % Final     Eosinophil %   Date Value Ref Range Status   07/17/2017 2.1 0.3 - 6.2 % Final     Basophil %   Date Value Ref Range Status   07/17/2017 0.7 0.0 - 1.5 % Final     Immature Grans %   Date Value Ref Range Status   07/17/2017 0.2 0.0 - 0.5 % Final     Neutrophils, Absolute   Date Value Ref Range Status   07/17/2017 6.91 1.90 - 8.10 10*3/mm3 Final     Lymphocytes, Absolute   Date Value Ref Range Status   07/17/2017 1.85 0.90 - 4.80 10*3/mm3 Final     Monocytes, Absolute   Date Value Ref Range Status    07/17/2017 0.69 0.20 - 1.20 10*3/mm3 Final     Eosinophils, Absolute   Date Value Ref Range Status   07/17/2017 0.20 0.00 - 0.70 10*3/mm3 Final     Basophils, Absolute   Date Value Ref Range Status   07/17/2017 0.07 0.00 - 0.20 10*3/mm3 Final     Immature Grans, Absolute   Date Value Ref Range Status   07/17/2017 0.02 0.00 - 0.03 10*3/mm3 Final       Glucose   Date Value Ref Range Status   07/17/2017 91 65 - 99 mg/dL Final     Sodium   Date Value Ref Range Status   10/20/2017 140 136 - 145 mmol/L Final   07/17/2017 140 136 - 145 mmol/L Final     Potassium   Date Value Ref Range Status   10/20/2017 4.7 3.5 - 5.2 mmol/L Final   07/17/2017 3.7 3.5 - 5.2 mmol/L Final     CO2   Date Value Ref Range Status   07/17/2017 24.6 22.0 - 29.0 mmol/L Final     Total CO2   Date Value Ref Range Status   10/20/2017 23.9 22.0 - 29.0 mmol/L Final     Chloride   Date Value Ref Range Status   10/20/2017 104 98 - 107 mmol/L Final   07/17/2017 105 98 - 107 mmol/L Final     Anion Gap   Date Value Ref Range Status   07/17/2017 10.4 mmol/L Final     Creatinine   Date Value Ref Range Status   10/20/2017 0.74 0.57 - 1.00 mg/dL Final   07/17/2017 0.79 0.57 - 1.00 mg/dL Final   06/29/2017 1.00 0.60 - 1.30 mg/dL Final     Comment:     Serial Number: [id number]    : [id number]     BUN   Date Value Ref Range Status   10/20/2017 18 6 - 20 mg/dL Final   07/17/2017 12 6 - 20 mg/dL Final     BUN/Creatinine Ratio   Date Value Ref Range Status   10/20/2017 24.3 7.0 - 25.0 Final   07/17/2017 15.2 7.0 - 25.0 Final     Calcium   Date Value Ref Range Status   10/20/2017 9.5 8.6 - 10.5 mg/dL Final   07/17/2017 9.5 8.6 - 10.5 mg/dL Final     eGFR Non  Amer   Date Value Ref Range Status   07/17/2017 83 >60 mL/min/1.73 Final     eGFR Non  Am   Date Value Ref Range Status   10/20/2017 89 >60 mL/min/1.73 Final     Alkaline Phosphatase   Date Value Ref Range Status   10/20/2017 83 39 - 117 U/L Final   07/17/2017 74 39 - 117 U/L Final     Total  Protein   Date Value Ref Range Status   07/17/2017 7.0 6.0 - 8.5 g/dL Final     ALT (SGPT)   Date Value Ref Range Status   10/20/2017 9 1 - 33 U/L Final   07/17/2017 13 1 - 33 U/L Final     AST (SGOT)   Date Value Ref Range Status   10/20/2017 14 1 - 32 U/L Final   07/17/2017 12 1 - 32 U/L Final     Total Bilirubin   Date Value Ref Range Status   10/20/2017 0.4 0.1 - 1.2 mg/dL Final   07/17/2017 0.6 0.1 - 1.2 mg/dL Final     Albumin   Date Value Ref Range Status   10/20/2017 3.90 3.50 - 5.20 g/dL Final   07/17/2017 3.80 3.50 - 5.20 g/dL Final     Globulin   Date Value Ref Range Status   07/17/2017 3.2 gm/dL Final     A/G Ratio   Date Value Ref Range Status   10/20/2017 1.3 g/dL Final   07/17/2017 1.2 g/dL Final         Imaging Results (last 7 days)     ** No results found for the last 168 hours. **            No notes on file    Assessment/Plan    1. GERD: setting of excess nsaid use but now persistent despite ppi.  ? Due to lap band/herniation?  She has also gained 24# in 4 months which could be contributing    2. Lap band: placed greater than 10 years ago.  ? The cause to her symptoms    3. Family history of colon polyps: in her mother before 60, she will be due for colonoscopy at age 41    Plan  -I will have her increase the pantoprazole to twice a day  -EGD for further evaluation of her symptoms and to assess the status of her lap band; this may be that she is having herniation or some other esophagitis from this    Mirian was seen today for heartburn.    Diagnoses and all orders for this visit:    Gastroesophageal reflux disease, esophagitis presence not specified  -     Case Request; Standing  -     Implement Anesthesia Orders Day of Procedure; Standing  -     Obtain Informed Consent; Standing  -     lactated ringers infusion; Infuse 30 mL/hr into a venous catheter Continuous.  -     Case Request  -     Case Request; Standing  -     Implement Anesthesia Orders Day of Procedure; Standing  -     Obtain Informed  Consent; Standing  -     lactated ringers infusion; Infuse 30 mL/hr into a venous catheter Continuous.  -     Case Request  -     pantoprazole (PROTONIX) 20 MG EC tablet; Take 1 tablet by mouth 2 (Two) Times a Day for 30 days.    LAP-BAND surgery status        I have discussed the above plan with the patient.  They verbalize understanding and are in agreement with the plan.  They have been advised to contact the office for any questions, concerns, or changes related to their health.    Dictated utilizing Dragon dictation

## 2017-12-13 NOTE — ADDENDUM NOTE
Addendum  created 12/13/17 1052 by Mago Freire MD    Anesthesia Review and Sign - Ready for Procedure, Anesthesia Review and Sign - Signed

## 2017-12-13 NOTE — ANESTHESIA POSTPROCEDURE EVALUATION
Patient: Mirian Allred    Procedure Summary     Date Anesthesia Start Anesthesia Stop Room / Location    12/13/17 1028 1053  [facility]       Procedure Diagnosis Surgeon Provider    ESOPHAGOGASTRODUODENOSCOPY with biopsies (N/A Esophagus) Gastroesophageal reflux disease, esophagitis presence not specified  (Gastroesophageal reflux disease, esophagitis presence not specified [K21.9]) MD Mago Eid MD          Anesthesia Type: MAC  Last vitals  BP   118/89 (12/13/17 0841)   Temp   36.6 °C (97.9 °F) (12/13/17 0841)   Pulse   81 (12/13/17 0841)   Resp   18 (12/13/17 0841)     SpO2   100 % (12/13/17 0841)     Post Anesthesia Care and Evaluation    Patient location during evaluation: bedside  Patient participation: complete - patient participated  Level of consciousness: awake  Pain management: adequate  Airway patency: patent  Anesthetic complications: No anesthetic complications    Cardiovascular status: acceptable  Respiratory status: acceptable  Hydration status: acceptable

## 2017-12-13 NOTE — ANESTHESIA PREPROCEDURE EVALUATION
Anesthesia Evaluation            Airway   Mallampati: I  TM distance: <3 FB  Neck ROM: full  no difficulty expected  Dental - normal exam     Pulmonary - normal exam   Cardiovascular - normal exam    (+) hypertension, hyperlipidemia      Neuro/Psych  (+) psychiatric history Anxiety,    GI/Hepatic/Renal/Endo    (+)  GERD,     Musculoskeletal     Abdominal  - normal exam    Bowel sounds: normal.   Substance History      OB/GYN          Other                                        Anesthesia Plan    ASA 2     MAC     Anesthetic plan and risks discussed with patient.

## 2017-12-14 ENCOUNTER — TELEPHONE (OUTPATIENT)
Dept: INTERNAL MEDICINE | Facility: CLINIC | Age: 37
End: 2017-12-14

## 2017-12-14 PROBLEM — K29.70 GASTRITIS: Status: ACTIVE | Noted: 2017-12-14

## 2017-12-14 LAB
CYTO UR: NORMAL
LAB AP CASE REPORT: NORMAL
Lab: NORMAL
PATH REPORT.FINAL DX SPEC: NORMAL
PATH REPORT.GROSS SPEC: NORMAL

## 2017-12-14 NOTE — PROGRESS NOTES
The stomach biopsies as well as the polyp showed mild chronic gastritis.    She is to follow-up with her bariatric surgeon regarding removal of the lap band if she cannot we will try to arrange to get her and with Dr. May.    Continue pantoprazole.    He is a colonoscopy at age 41, please place in 4 year recall.    Follow-up with me in the office in about 3 months

## 2017-12-14 NOTE — TELEPHONE ENCOUNTER
I spoke to patient  And informed her that a general surgeon cannot remove lap band. I'm not sure if bariatric surgeon does hernia repairs or not.    I gave patient the number to Dr. May's office (bariatric surgeon) and told her his office should be able to answer her questions.    I also gave her the number to Dr. Garvin's office in case she needs a general surgeon also.    Patient understands instructions.

## 2017-12-14 NOTE — TELEPHONE ENCOUNTER
----- Message from Arturo Allen sent at 12/14/2017  8:57 AM EST -----  Regarding: PATIENT CALL  Contact: [phone]  Patient said she had her EGD yesterday and was told she has a hiatal hernia.  She is asking for a surgical recommendation whom she could see to have that repaired.  She was also advised to have her Lap Band removed (the surgeon who put it in has left the state).

## 2018-01-04 ENCOUNTER — TELEPHONE (OUTPATIENT)
Dept: GASTROENTEROLOGY | Facility: CLINIC | Age: 38
End: 2018-01-04

## 2018-01-04 NOTE — TELEPHONE ENCOUNTER
----- Message from Indu Myers MD sent at 12/14/2017  2:33 PM EST -----  The stomach biopsies as well as the polyp showed mild chronic gastritis.    She is to follow-up with her bariatric surgeon regarding removal of the lap band if she cannot we will try to arrange to get her and with Dr. May.    Continue pantoprazole.    He is a colonoscopy at age 41, please place in 4 year recall.    Follow-up with me in the office in about 3 months

## 2018-01-09 NOTE — TELEPHONE ENCOUNTER
Called pt and advised per Dr Myers that the stomach bx as well as the polyp showed mild chronic gastritis.  She is to follow up with her bariatric surgeon regarding removal of the lap band .  Pt states she has already contacted Dr May.  She is to continue pantoprazole.  She will be due for a c/s at age 41 and f/u with Dr Myers in 3 months.     Pt verb understanding and states she will call back to make appt.

## 2018-01-31 ENCOUNTER — TELEPHONE (OUTPATIENT)
Dept: INTERNAL MEDICINE | Facility: CLINIC | Age: 38
End: 2018-01-31

## 2018-01-31 NOTE — TELEPHONE ENCOUNTER
----- Message from DERRICK Jim sent at 1/31/2018  5:07 PM EST -----  Regarding: RE: MED REQUEST  Contact: [phone]  Yes she will need a visit to restart, its a controlled substance.   ----- Message -----     From: Aidee Alfonso     Sent: 1/31/2018  12:12 PM       To: DERRICK Jim  Subject: FW: MED REQUEST                                      ----- Message -----     From: Joyce Galarza     Sent: 1/31/2018  11:21 AM       To: Aidee Alfonso  Subject: MED REQUEST                                      Patient would like to go back on the Qsymia now that her surgery is over. She would like for us to call it in, I told her she might need to be seen first. Please call her at [phone]. Thanks

## 2018-02-05 ENCOUNTER — OFFICE VISIT (OUTPATIENT)
Dept: INTERNAL MEDICINE | Facility: CLINIC | Age: 38
End: 2018-02-05

## 2018-02-05 VITALS
WEIGHT: 221.1 LBS | DIASTOLIC BLOOD PRESSURE: 84 MMHG | HEART RATE: 85 BPM | HEIGHT: 67 IN | OXYGEN SATURATION: 99 % | BODY MASS INDEX: 34.7 KG/M2 | SYSTOLIC BLOOD PRESSURE: 120 MMHG

## 2018-02-05 DIAGNOSIS — I10 BENIGN ESSENTIAL HYPERTENSION: Primary | ICD-10-CM

## 2018-02-05 DIAGNOSIS — E66.9 OBESITY (BMI 30.0-34.9): ICD-10-CM

## 2018-02-05 PROCEDURE — 99214 OFFICE O/P EST MOD 30 MIN: CPT | Performed by: NURSE PRACTITIONER

## 2018-02-05 NOTE — PROGRESS NOTES
Julia Allred is a 37 y.o. female.     History of Present Illness   The patient is here today to discuss Qsymia. Had right shoulder surgery this past summer. Now recovering, back to the gym, will work with a  next week.   She is up about 40 lbs from this past summer.   She has just restarted [employer] last week.     HTN-Pt is doing well with current medication regimen, denies adverse reactions, compliant with medication schedule.     The following portions of the patient's history were reviewed and updated as appropriate: allergies, current medications, past family history, past medical history, past social history, past surgical history and problem list.    Review of Systems   Constitutional: Negative.    Respiratory: Negative.    Cardiovascular: Negative.    Psychiatric/Behavioral: Negative.        Objective   Physical Exam   Constitutional: She appears well-developed and well-nourished.   Neck: Normal range of motion. Neck supple. No thyromegaly present.   Cardiovascular: Normal rate, regular rhythm, normal heart sounds and intact distal pulses.    Pulmonary/Chest: Effort normal and breath sounds normal.   Skin: Skin is warm and dry.   Psychiatric: She has a normal mood and affect. Her behavior is normal. Judgment and thought content normal.       Assessment/Plan   There are no diagnoses linked to this encounter.    1. HTN- watch BP while losing wt, call for syst <110  2. Obesity- EKG UTD nl 7/2017, pt needs to meet caloric goal, eat real food. Discussed using double contraception while taking qsymia. Discussed SEs and appropriate dosing. Hammad UTD and appropriate. We willl see back in 6 weeks, work on wt loss goal of 4lbs. Check CMP at check up.     Well check up in April .

## 2018-03-20 ENCOUNTER — OFFICE VISIT (OUTPATIENT)
Dept: INTERNAL MEDICINE | Facility: CLINIC | Age: 38
End: 2018-03-20

## 2018-03-20 VITALS
HEIGHT: 67 IN | BODY MASS INDEX: 33.15 KG/M2 | OXYGEN SATURATION: 99 % | TEMPERATURE: 97.7 F | DIASTOLIC BLOOD PRESSURE: 64 MMHG | SYSTOLIC BLOOD PRESSURE: 104 MMHG | HEART RATE: 74 BPM | WEIGHT: 211.2 LBS

## 2018-03-20 DIAGNOSIS — E66.9 OBESITY (BMI 30.0-34.9): ICD-10-CM

## 2018-03-20 DIAGNOSIS — E78.5 HYPERLIPIDEMIA, UNSPECIFIED HYPERLIPIDEMIA TYPE: ICD-10-CM

## 2018-03-20 DIAGNOSIS — I10 BENIGN ESSENTIAL HYPERTENSION: Primary | ICD-10-CM

## 2018-03-20 DIAGNOSIS — E55.9 VITAMIN D DEFICIENCY: ICD-10-CM

## 2018-03-20 PROCEDURE — 99213 OFFICE O/P EST LOW 20 MIN: CPT | Performed by: NURSE PRACTITIONER

## 2018-03-20 RX ORDER — VALSARTAN 40 MG/1
40 TABLET ORAL DAILY
Qty: 30 TABLET | Refills: 6 | Status: SHIPPED | OUTPATIENT
Start: 2018-03-20 | End: 2018-07-23

## 2018-03-20 NOTE — PROGRESS NOTES
Julia Allred is a 37 y.o. female.     History of Present Illness   The patient is here today to follow-up on weight loss.  At last visit she is Qsymia restarted.  She is down 10 pounds last 6 weeks. She is working with her neighbor who is a nutritionist. She is going grocery shopping weekly and is menu planning. She really is focusing on eating real foods, 5 servings of fruits and veggies.   She is also working with her other neighbor who is a  once [employer].     She is planning on moving to [facility] soon. 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     She is doing well with qsymia 7.5 mg, denies SEs.   The following portions of the patient's history were reviewed and updated as appropriate: allergies, current medications, past family history, past medical history, past social history, past surgical history and problem list.    Review of Systems   Constitutional: Negative.    Respiratory: Negative.    Cardiovascular: Negative.    Psychiatric/Behavioral: Negative for decreased concentration and suicidal ideas. The patient is nervous/anxious (situational).        Objective   Physical Exam   Constitutional: She appears well-developed and well-nourished.   Neck: Normal range of motion. Neck supple. No thyromegaly present.   Cardiovascular: Normal rate, regular rhythm, normal heart sounds and intact distal pulses.    Pulmonary/Chest: Effort normal and breath sounds normal.   Skin: Skin is warm and dry.   Psychiatric: She has a normal mood and affect. Her behavior is normal. Judgment and thought content normal.       Assessment/Plan   There are no diagnoses linked to this encounter.    1. HTN- BP on the low end, try valsartan 40 mg daily  2. Obesity- continue same, doing great, recheck in 8 weeks, wt loss goal of 8 lbs. Encouraged pt to have dual birth control measures.

## 2018-05-15 DIAGNOSIS — E78.5 HYPERLIPIDEMIA, UNSPECIFIED HYPERLIPIDEMIA TYPE: ICD-10-CM

## 2018-05-15 DIAGNOSIS — E66.9 OBESITY (BMI 30.0-34.9): ICD-10-CM

## 2018-05-15 DIAGNOSIS — I10 BENIGN ESSENTIAL HYPERTENSION: ICD-10-CM

## 2018-05-18 LAB
ALBUMIN SERPL-MCNC: 4 G/DL (ref 3.5–5.5)
ALBUMIN/GLOB SERPL: 1.4 {RATIO} (ref 1.2–2.2)
ALP SERPL-CCNC: 84 IU/L (ref 39–117)
ALT SERPL-CCNC: 12 IU/L (ref 0–32)
AST SERPL-CCNC: 14 IU/L (ref 0–40)
BILIRUB SERPL-MCNC: 0.5 MG/DL (ref 0–1.2)
BUN SERPL-MCNC: 13 MG/DL (ref 6–20)
BUN/CREAT SERPL: 16 (ref 9–23)
CALCIUM SERPL-MCNC: 9.3 MG/DL (ref 8.7–10.2)
CHLORIDE SERPL-SCNC: 102 MMOL/L (ref 96–106)
CHOLEST SERPL-MCNC: 246 MG/DL (ref 100–199)
CO2 SERPL-SCNC: 24 MMOL/L (ref 18–29)
CREAT SERPL-MCNC: 0.81 MG/DL (ref 0.57–1)
GFR SERPLBLD CREATININE-BSD FMLA CKD-EPI: 108 ML/MIN/1.73
GFR SERPLBLD CREATININE-BSD FMLA CKD-EPI: 94 ML/MIN/1.73
GLOBULIN SER CALC-MCNC: 2.8 G/DL (ref 1.5–4.5)
GLUCOSE SERPL-MCNC: 77 MG/DL (ref 65–99)
HDLC SERPL-MCNC: 92 MG/DL
LDLC SERPL CALC-MCNC: 128 MG/DL (ref 0–99)
LDLC/HDLC SERPL: 1.4 RATIO (ref 0–3.2)
POTASSIUM SERPL-SCNC: 4.4 MMOL/L (ref 3.5–5.2)
PROT SERPL-MCNC: 6.8 G/DL (ref 6–8.5)
SODIUM SERPL-SCNC: 141 MMOL/L (ref 134–144)
TRIGL SERPL-MCNC: 132 MG/DL (ref 0–149)
VLDLC SERPL CALC-MCNC: 26 MG/DL (ref 5–40)

## 2018-05-21 ENCOUNTER — OFFICE VISIT (OUTPATIENT)
Dept: INTERNAL MEDICINE | Facility: CLINIC | Age: 38
End: 2018-05-21

## 2018-05-21 VITALS
SYSTOLIC BLOOD PRESSURE: 124 MMHG | HEART RATE: 78 BPM | TEMPERATURE: 97.8 F | DIASTOLIC BLOOD PRESSURE: 76 MMHG | WEIGHT: 212.7 LBS | OXYGEN SATURATION: 99 % | BODY MASS INDEX: 33.38 KG/M2 | HEIGHT: 67 IN

## 2018-05-21 DIAGNOSIS — E78.5 HYPERLIPIDEMIA, UNSPECIFIED HYPERLIPIDEMIA TYPE: ICD-10-CM

## 2018-05-21 DIAGNOSIS — E66.9 OBESITY (BMI 30.0-34.9): ICD-10-CM

## 2018-05-21 DIAGNOSIS — E55.9 VITAMIN D DEFICIENCY: ICD-10-CM

## 2018-05-21 DIAGNOSIS — I10 BENIGN ESSENTIAL HYPERTENSION: Primary | ICD-10-CM

## 2018-05-21 PROCEDURE — 90632 HEPA VACCINE ADULT IM: CPT | Performed by: NURSE PRACTITIONER

## 2018-05-21 PROCEDURE — 90471 IMMUNIZATION ADMIN: CPT | Performed by: NURSE PRACTITIONER

## 2018-05-21 PROCEDURE — 99214 OFFICE O/P EST MOD 30 MIN: CPT | Performed by: NURSE PRACTITIONER

## 2018-05-21 NOTE — PROGRESS NOTES
Julia Allred is a 37 y.o. female here for a follow up on htn and hyperlipidemia.    History of Present Illness   The patient is here today follow-up on lab work.  Hypertension- ok to increase back up to 80 mg daily.   Hyperlipidemia- need to get back to eating well.   Obesity-Qsymia started February 5, 2018, initial weight was 221 pounds.  Current weight is 212 pounds.  She has had a 1 pound weight gain since March.    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 She has gained and lost from several trips. She reports she is not sleeping or eating as well due to all the stress.   The following portions of the patient's history were reviewed and updated as appropriate: allergies, current medications, past family history, past medical history, past social history, past surgical history and problem list.    Review of Systems   Constitutional: Negative.    Respiratory: Negative.    Cardiovascular: Negative.    Psychiatric/Behavioral: Negative for dysphoric mood and suicidal ideas. The patient is nervous/anxious.        Objective   Physical Exam   Constitutional: She appears well-developed and well-nourished.   Neck: Normal range of motion. Neck supple. No thyromegaly present.   Cardiovascular: Normal rate, regular rhythm, normal heart sounds and intact distal pulses.    Pulmonary/Chest: Effort normal and breath sounds normal.   Skin: Skin is warm and dry.   Psychiatric: She has a normal mood and affect. Her behavior is normal. Judgment and thought content normal.       Assessment/Plan   There are no diagnoses linked to this encounter.    1. HTN- continue 80 mg for now, if syst <110 return to 40 mg daily  2. HPL- continue to work on wt loss mediterranean style diet  3. Obesity- needs to track calories, get back to sleeping at least 7 hrs a night, work on 6 lbs wt loss in the next 8 weeks. Pt denies SEs, aware of approp use.

## 2018-05-23 DIAGNOSIS — I10 ESSENTIAL HYPERTENSION: ICD-10-CM

## 2018-05-23 RX ORDER — VALSARTAN 80 MG/1
TABLET ORAL
Qty: 90 TABLET | Refills: 0 | Status: SHIPPED | OUTPATIENT
Start: 2018-05-23 | End: 2018-07-23

## 2018-07-23 ENCOUNTER — OFFICE VISIT (OUTPATIENT)
Dept: INTERNAL MEDICINE | Facility: CLINIC | Age: 38
End: 2018-07-23

## 2018-07-23 VITALS
WEIGHT: 204.2 LBS | OXYGEN SATURATION: 98 % | SYSTOLIC BLOOD PRESSURE: 120 MMHG | DIASTOLIC BLOOD PRESSURE: 74 MMHG | HEART RATE: 109 BPM | BODY MASS INDEX: 32.05 KG/M2 | HEIGHT: 67 IN

## 2018-07-23 DIAGNOSIS — I10 BENIGN ESSENTIAL HYPERTENSION: Primary | ICD-10-CM

## 2018-07-23 DIAGNOSIS — E66.9 OBESITY (BMI 30.0-34.9): ICD-10-CM

## 2018-07-23 PROCEDURE — 99213 OFFICE O/P EST LOW 20 MIN: CPT | Performed by: NURSE PRACTITIONER

## 2018-07-23 RX ORDER — LOSARTAN POTASSIUM 100 MG/1
100 TABLET ORAL DAILY
Qty: 30 TABLET | Refills: 6 | Status: SHIPPED | OUTPATIENT
Start: 2018-07-23 | End: 2019-01-25 | Stop reason: SDUPTHER

## 2018-07-23 NOTE — PROGRESS NOTES
Julia Allred is a 38 y.o. female.     History of Present Illness   The patient is here today to follow-up on hypertension and obesity.  HTN- at last visit valsartan increased back up to 80 mg  Obesity- Qsymia started February 5, 2018, initial weight was 221 pounds.  Current weight is 204 pounds patient has lost 8 pounds since last visit. Better with sleeping more. She is tracking her food. She does have the lap band and some days can't eat as much as recommended. She is watching her macros. Ultimate wt loss goal 167lbs.   The following portions of the patient's history were reviewed and updated as appropriate: allergies, current medications, past family history, past medical history, past social history, past surgical history and problem list.    Review of Systems   Constitutional: Negative.    Respiratory: Negative.    Cardiovascular: Negative.    Psychiatric/Behavioral: Negative for dysphoric mood and suicidal ideas. The patient is nervous/anxious (over house).        Objective   Physical Exam   Constitutional: She appears well-developed and well-nourished.   Neck: Normal range of motion. Neck supple. No thyromegaly present.   Cardiovascular: Normal rate, regular rhythm, normal heart sounds and intact distal pulses.    Pulmonary/Chest: Effort normal and breath sounds normal.   Skin: Skin is warm and dry.   Psychiatric: She has a normal mood and affect. Her behavior is normal. Judgment and thought content normal.       Assessment/Plan   There are no diagnoses linked to this encounter.    1. HTN- stop valsartan 80 mg daily, try losartan 100 mg daily, call for syst >140 or <110 or diast >90, ok to try 1/2 tab initially  2. Obesity- work on small freq meals, less fat. Discussed weaning end of September for break from med. 8 week F/U wt loss goal 5 lbs. Continue to track and sleep. Aware of approp use and SEs. Hammad today.

## 2018-10-04 ENCOUNTER — OFFICE VISIT (OUTPATIENT)
Dept: INTERNAL MEDICINE | Facility: CLINIC | Age: 38
End: 2018-10-04

## 2018-10-04 VITALS
SYSTOLIC BLOOD PRESSURE: 122 MMHG | DIASTOLIC BLOOD PRESSURE: 80 MMHG | BODY MASS INDEX: 33.59 KG/M2 | OXYGEN SATURATION: 98 % | WEIGHT: 214 LBS | HEIGHT: 67 IN | HEART RATE: 109 BPM

## 2018-10-04 DIAGNOSIS — Z23 NEED FOR INFLUENZA VACCINATION: ICD-10-CM

## 2018-10-04 DIAGNOSIS — E66.9 OBESITY (BMI 30.0-34.9): Primary | ICD-10-CM

## 2018-10-04 PROCEDURE — 99213 OFFICE O/P EST LOW 20 MIN: CPT | Performed by: NURSE PRACTITIONER

## 2018-10-04 PROCEDURE — 90674 CCIIV4 VAC NO PRSV 0.5 ML IM: CPT | Performed by: NURSE PRACTITIONER

## 2018-10-04 PROCEDURE — 90471 IMMUNIZATION ADMIN: CPT | Performed by: NURSE PRACTITIONER

## 2018-10-04 NOTE — PROGRESS NOTES
Subjective   Mirian Allred is a 38 y.o. female. Patient is here today for   Chief Complaint   Patient presents with   • Weight Check     Pt here to follow up on weight. Pt currently on phentermine-topiramate.    .    History of Present Illness   Patient is here to follow up with weight loss. She is currently on Qsymia. She has been on this for a few months.  She is under stress with having to take care of her daughter. She was injured and is currently at home being home schooled. Patient hasn't been able to exercise and hasn't tracked her food. Denies any side effects with the medication.     The following portions of the patient's history were reviewed and updated as appropriate: allergies, current medications, past family history, past medical history, past social history, past surgical history and problem list.    Review of Systems   Constitutional: Negative.    Respiratory: Negative.    Cardiovascular: Negative.        Objective   Vitals:    10/04/18 0949   BP: 122/80   Pulse: 109   SpO2: 98%     Physical Exam   Constitutional: Vital signs are normal. She appears well-developed and well-nourished.   Cardiovascular: Normal rate, regular rhythm and normal heart sounds.    Pulmonary/Chest: Effort normal and breath sounds normal.   Skin: Skin is warm, dry and intact.   Psychiatric: She has a normal mood and affect. Her speech is normal and behavior is normal. Thought content normal.   Nursing note and vitals reviewed.      Assessment/Plan   Mirian was seen today for weight check.    Diagnoses and all orders for this visit:    Obesity (BMI 30.0-34.9)  -     Phentermine-Topiramate 11.25-69 MG capsule sustained-release 24 hr; Take 1 capsule by mouth Daily.      Will increase dosage. Patient will try to track her food intake. She will also start walking in the evenings.

## 2018-10-24 DIAGNOSIS — Z30.41 ENCOUNTER FOR SURVEILLANCE OF CONTRACEPTIVE PILLS: ICD-10-CM

## 2018-10-25 RX ORDER — DROSPIRENONE AND ETHINYL ESTRADIOL 0.03MG-3MG
KIT ORAL
Qty: 28 TABLET | Refills: 0 | Status: SHIPPED | OUTPATIENT
Start: 2018-10-25 | End: 2018-11-01 | Stop reason: SDUPTHER

## 2018-11-01 DIAGNOSIS — Z30.41 ENCOUNTER FOR SURVEILLANCE OF CONTRACEPTIVE PILLS: ICD-10-CM

## 2018-11-02 RX ORDER — DROSPIRENONE AND ETHINYL ESTRADIOL 0.03MG-3MG
KIT ORAL
Qty: 84 TABLET | Refills: 0 | Status: SHIPPED | OUTPATIENT
Start: 2018-11-02 | End: 2019-02-19 | Stop reason: SDUPTHER

## 2018-11-21 ENCOUNTER — RESULTS ENCOUNTER (OUTPATIENT)
Dept: INTERNAL MEDICINE | Facility: CLINIC | Age: 38
End: 2018-11-21

## 2018-11-21 DIAGNOSIS — I10 BENIGN ESSENTIAL HYPERTENSION: ICD-10-CM

## 2018-11-21 DIAGNOSIS — E66.9 OBESITY (BMI 30.0-34.9): ICD-10-CM

## 2018-11-21 DIAGNOSIS — E55.9 VITAMIN D DEFICIENCY: ICD-10-CM

## 2018-11-21 DIAGNOSIS — E78.5 HYPERLIPIDEMIA, UNSPECIFIED HYPERLIPIDEMIA TYPE: ICD-10-CM

## 2018-12-10 ENCOUNTER — OFFICE VISIT (OUTPATIENT)
Dept: INTERNAL MEDICINE | Facility: CLINIC | Age: 38
End: 2018-12-10

## 2018-12-10 VITALS
WEIGHT: 216 LBS | DIASTOLIC BLOOD PRESSURE: 78 MMHG | OXYGEN SATURATION: 98 % | HEART RATE: 90 BPM | SYSTOLIC BLOOD PRESSURE: 122 MMHG | BODY MASS INDEX: 33.9 KG/M2 | HEIGHT: 67 IN

## 2018-12-10 DIAGNOSIS — E66.9 OBESITY (BMI 30.0-34.9): Primary | ICD-10-CM

## 2018-12-10 DIAGNOSIS — F41.1 GENERALIZED ANXIETY DISORDER: ICD-10-CM

## 2018-12-10 DIAGNOSIS — Z23 NEED FOR HEPATITIS A VACCINATION: ICD-10-CM

## 2018-12-10 PROCEDURE — 90471 IMMUNIZATION ADMIN: CPT | Performed by: NURSE PRACTITIONER

## 2018-12-10 PROCEDURE — 90472 IMMUNIZATION ADMIN EACH ADD: CPT | Performed by: NURSE PRACTITIONER

## 2018-12-10 PROCEDURE — 90632 HEPA VACCINE ADULT IM: CPT | Performed by: NURSE PRACTITIONER

## 2018-12-10 PROCEDURE — 99214 OFFICE O/P EST MOD 30 MIN: CPT | Performed by: NURSE PRACTITIONER

## 2018-12-10 RX ORDER — BUSPIRONE HYDROCHLORIDE 5 MG/1
5 TABLET ORAL 3 TIMES DAILY PRN
Qty: 60 TABLET | Refills: 1 | Status: SHIPPED | OUTPATIENT
Start: 2018-12-10

## 2018-12-10 NOTE — PROGRESS NOTES
Subjective   Mirian Allred is a 38 y.o. female. Patient is here today for   Chief Complaint   Patient presents with   • Obesity     Pt here to follow up on weight.    • Anxiety     Pt complains of having Anxiety since September.    .    History of Present Illness   Patient is here to follow up on weight. She has been on Qsymia and has actually gained 2 pounds since September. She is not able exercise regularly because she is taking care of her daughter full time. Her daughter is being home schooled at this time due to an accident. Patient is hoping that her daughter will be able to return to school soon.    Patient is also having symptoms of anxiety and depression. She thinks that a lot of it may due to her daughter's injury. She was treated for this about 8-10 years ago and was on Cymbalta. Denies any problems with that medication.     The following portions of the patient's history were reviewed and updated as appropriate: allergies, current medications, past family history, past medical history, past social history, past surgical history and problem list.    Review of Systems   Constitutional: Negative.    Respiratory: Negative.    Cardiovascular: Negative.    Psychiatric/Behavioral: Positive for dysphoric mood. The patient is nervous/anxious.        Objective   Vitals:    12/10/18 0947   BP: 122/78   Pulse: 90   SpO2: 98%     Physical Exam   Constitutional: Vital signs are normal. She appears well-developed and well-nourished.   Cardiovascular: Normal rate, regular rhythm and normal heart sounds.   Pulmonary/Chest: Effort normal and breath sounds normal.   Skin: Skin is warm, dry and intact.   Psychiatric: She has a normal mood and affect. Her speech is normal and behavior is normal. Thought content normal.   Nursing note and vitals reviewed.      Assessment/Plan   Mirian was seen today for obesity and anxiety.    Diagnoses and all orders for this visit:    Obesity (BMI 30.0-34.9)  -     Discontinue:  naltrexone-bupropion ER (CONTRAVE) 8-90 MG tablet; Wk 1: 1 tab daily, Wk 2: 1 tab twice a day, Wk 3: 2 tabs in AM, 1 tab in PM, Wk 4: 2 tabs twice a day, Maintenance dose: 2 tabs twice daily.    Generalized anxiety disorder  -     busPIRone (BUSPAR) 5 MG tablet; Take 1 tablet by mouth 3 (Three) Times a Day As Needed (anxiety).    Need for hepatitis A vaccination  -     Hepatitis A Vaccine Adult IM      Obesity - will try contrave. F/u with Indu in one month    Anxiety - will try buspar. F/u in one month

## 2018-12-13 ENCOUNTER — TELEPHONE (OUTPATIENT)
Dept: INTERNAL MEDICINE | Facility: CLINIC | Age: 38
End: 2018-12-13

## 2018-12-13 RX ORDER — BUPROPION HYDROCHLORIDE 150 MG/1
150 TABLET ORAL DAILY
Qty: 30 TABLET | Refills: 1 | Status: SHIPPED | OUTPATIENT
Start: 2018-12-13 | End: 2019-05-13

## 2018-12-13 NOTE — TELEPHONE ENCOUNTER
----- Message from DERRICK Mason sent at 12/13/2018  3:08 PM EST -----  Okay to start wellbutrin 150 mg po daily. F/u with Indu in one month.    ----- Message -----  From: Carson Partida  Sent: 12/13/2018   1:48 PM  To: DERRICK Maosn    Patient says Contrave not covered. She would like a weight loss medication along with a separate depression medication. Patient state qsymia was covered by insurance in the past.     I informed her that Indu discussed with her stopping the qsymia which she is fine with if you want her to?    She would still like a depression medication. Please advise.  ----- Message -----  From: Yamilex Milian APRN  Sent: 12/13/2018   1:28 PM  To: Carson Partida    She needs to check with her insurance to see what else might be covered. Most of these medications are not covered by insurance.   In her July office visit note, Indu talked about the patient taking a break from medication for awhile.     ----- Message -----  From: Carson Partida  Sent: 12/13/2018   9:59 AM  To: DERRICK Mason    Patient saw you on 12/10. Please advise  ----- Message -----  From: Yulissa Fermin RegSched Rep  Sent: 12/13/2018   9:41 AM  To: Carson Partida    Pt can't afford this medication even with the savings card. She would like to try something else.  naltrexone-bupropion ER (CONTRAVE) 8-90 MG tablet      [facility]:[phone] (Phone

## 2018-12-13 NOTE — TELEPHONE ENCOUNTER
Patient informed.    Patient to hold off on weight loss med for now. She will discuss this with Indu at next office visit.    Wellburtin sent to pharmacy. Patient aware.

## 2019-01-25 DIAGNOSIS — I10 BENIGN ESSENTIAL HYPERTENSION: ICD-10-CM

## 2019-01-25 RX ORDER — LOSARTAN POTASSIUM 100 MG/1
100 TABLET ORAL DAILY
Qty: 30 TABLET | Refills: 6 | Status: SHIPPED | OUTPATIENT
Start: 2019-01-25 | End: 2019-05-13 | Stop reason: SDUPTHER

## 2019-01-29 ENCOUNTER — TRANSCRIBE ORDERS (OUTPATIENT)
Dept: ADMINISTRATIVE | Facility: HOSPITAL | Age: 39
End: 2019-01-29

## 2019-01-29 DIAGNOSIS — Z13.6 ENCOUNTER FOR SCREENING FOR VASCULAR DISEASE: Primary | ICD-10-CM

## 2019-02-18 DIAGNOSIS — F41.1 GENERALIZED ANXIETY DISORDER: ICD-10-CM

## 2019-02-18 RX ORDER — BUSPIRONE HYDROCHLORIDE 5 MG/1
5 TABLET ORAL 3 TIMES DAILY PRN
Qty: 60 TABLET | Refills: 1 | OUTPATIENT
Start: 2019-02-18

## 2019-02-19 DIAGNOSIS — Z30.41 ENCOUNTER FOR SURVEILLANCE OF CONTRACEPTIVE PILLS: ICD-10-CM

## 2019-02-19 RX ORDER — DROSPIRENONE AND ETHINYL ESTRADIOL 0.03MG-3MG
1 KIT ORAL DAILY
Qty: 84 TABLET | Refills: 0 | Status: SHIPPED | OUTPATIENT
Start: 2019-02-19 | End: 2019-05-13 | Stop reason: SDUPTHER

## 2019-02-27 ENCOUNTER — APPOINTMENT (OUTPATIENT)
Dept: CARDIOLOGY | Facility: HOSPITAL | Age: 39
End: 2019-02-27

## 2019-02-27 ENCOUNTER — HOSPITAL ENCOUNTER (OUTPATIENT)
Dept: CARDIOLOGY | Facility: HOSPITAL | Age: 39
Discharge: HOME | End: 2019-02-27
Admitting: NURSE PRACTITIONER

## 2019-02-27 VITALS
DIASTOLIC BLOOD PRESSURE: 85 MMHG | HEIGHT: 67 IN | WEIGHT: 229 LBS | BODY MASS INDEX: 35.94 KG/M2 | SYSTOLIC BLOOD PRESSURE: 135 MMHG | HEART RATE: 68 BPM

## 2019-02-27 DIAGNOSIS — Z13.6 ENCOUNTER FOR SCREENING FOR VASCULAR DISEASE: ICD-10-CM

## 2019-02-27 LAB
BH CV ECHO MEAS - DIST AO DIAM: 1.43 CM
BH CV VAS BP LEFT ARM: NORMAL MMHG
BH CV VAS BP RIGHT ARM: NORMAL MMHG
BH CV XLRA MEAS - MID AO DIAM: 1.62 CM
BH CV XLRA MEAS - PAD LEFT ABI DP: 1.03
BH CV XLRA MEAS - PAD LEFT ABI PT: 1.03
BH CV XLRA MEAS - PAD LEFT ARM: 135 MMHG
BH CV XLRA MEAS - PAD LEFT LEG DP: 140 MMHG
BH CV XLRA MEAS - PAD LEFT LEG PT: 140 MMHG
BH CV XLRA MEAS - PAD RIGHT ABI DP: 1
BH CV XLRA MEAS - PAD RIGHT ABI PT: 1.03
BH CV XLRA MEAS - PAD RIGHT ARM: 133 MMHG
BH CV XLRA MEAS - PAD RIGHT LEG DP: 136 MMHG
BH CV XLRA MEAS - PAD RIGHT LEG PT: 140 MMHG
BH CV XLRA MEAS - PROX AO DIAM: 1.76 CM
BH CV XLRA MEAS LEFT ICA/CCA RATIO: 1.03
BH CV XLRA MEAS LEFT MID CCA PSV: NORMAL CM/SEC
BH CV XLRA MEAS LEFT MID ICA PSV: NORMAL CM/SEC
BH CV XLRA MEAS LEFT PROX ECA PSV: NORMAL CM/SEC
BH CV XLRA MEAS RIGHT ICA/CCA RATIO: 1.02
BH CV XLRA MEAS RIGHT MID CCA PSV: NORMAL CM/SEC
BH CV XLRA MEAS RIGHT MID ICA PSV: NORMAL CM/SEC
BH CV XLRA MEAS RIGHT PROX ECA PSV: NORMAL CM/SEC

## 2019-02-27 PROCEDURE — 93799 UNLISTED CV SVC/PROCEDURE: CPT

## 2019-02-28 ENCOUNTER — TELEPHONE (OUTPATIENT)
Dept: INTERNAL MEDICINE | Facility: CLINIC | Age: 39
End: 2019-02-28

## 2019-02-28 DIAGNOSIS — E04.2 MULTIPLE THYROID NODULES: Primary | ICD-10-CM

## 2019-02-28 NOTE — TELEPHONE ENCOUNTER
----- Message from DERRICK Jim sent at 2/28/2019 12:58 PM EST -----  Ok that's fine  ----- Message -----  From: Carson Partida  Sent: 2/28/2019  11:52 AM  To: DERRICK Jim    Just FYI  ----- Message -----  From: Yulissa Fermin RegSched Rep  Sent: 2/28/2019  11:42 AM  To: Carson Partida    Pt called because she doesn't want an ultrasound and she already made herself an appointment with an ENT

## 2019-02-28 NOTE — TELEPHONE ENCOUNTER
----- Message from DERRICK Jim sent at 2/28/2019  9:48 AM EST -----  Recent vascular screening showed thyroid nodules, please notify pt and set her up for thyroid US.

## 2019-05-09 LAB
25(OH)D3+25(OH)D2 SERPL-MCNC: 73.8 NG/ML (ref 30–100)
ALBUMIN SERPL-MCNC: 4 G/DL (ref 3.5–5.2)
ALBUMIN/GLOB SERPL: 1.3 G/DL
ALP SERPL-CCNC: 90 U/L (ref 39–117)
ALT SERPL-CCNC: 10 U/L (ref 1–33)
AST SERPL-CCNC: 15 U/L (ref 1–32)
BASOPHILS # BLD AUTO: 0.05 10*3/MM3 (ref 0–0.2)
BASOPHILS NFR BLD AUTO: 0.6 % (ref 0–1.5)
BILIRUB SERPL-MCNC: 0.8 MG/DL (ref 0.2–1.2)
BUN SERPL-MCNC: 16 MG/DL (ref 6–20)
BUN/CREAT SERPL: 20.3 (ref 7–25)
CALCIUM SERPL-MCNC: 9.8 MG/DL (ref 8.6–10.5)
CHLORIDE SERPL-SCNC: 102 MMOL/L (ref 98–107)
CHOLEST SERPL-MCNC: 257 MG/DL (ref 0–200)
CO2 SERPL-SCNC: 23.6 MMOL/L (ref 22–29)
CREAT SERPL-MCNC: 0.79 MG/DL (ref 0.57–1)
EOSINOPHIL # BLD AUTO: 0.17 10*3/MM3 (ref 0–0.4)
EOSINOPHIL NFR BLD AUTO: 2.1 % (ref 0.3–6.2)
ERYTHROCYTE [DISTWIDTH] IN BLOOD BY AUTOMATED COUNT: 14.3 % (ref 12.3–15.4)
GLOBULIN SER CALC-MCNC: 3.1 GM/DL
GLUCOSE SERPL-MCNC: 71 MG/DL (ref 65–99)
HCT VFR BLD AUTO: 44.2 % (ref 34–46.6)
HDLC SERPL-MCNC: 110 MG/DL (ref 40–60)
HGB BLD-MCNC: 13.7 G/DL (ref 12–15.9)
IMM GRANULOCYTES # BLD AUTO: 0.03 10*3/MM3 (ref 0–0.05)
IMM GRANULOCYTES NFR BLD AUTO: 0.4 % (ref 0–0.5)
LDLC SERPL CALC-MCNC: 124 MG/DL (ref 0–100)
LDLC/HDLC SERPL: 1.13 {RATIO}
LYMPHOCYTES # BLD AUTO: 1.62 10*3/MM3 (ref 0.7–3.1)
LYMPHOCYTES NFR BLD AUTO: 20.2 % (ref 19.6–45.3)
MCH RBC QN AUTO: 28.3 PG (ref 26.6–33)
MCHC RBC AUTO-ENTMCNC: 31 G/DL (ref 31.5–35.7)
MCV RBC AUTO: 91.3 FL (ref 79–97)
MONOCYTES # BLD AUTO: 0.43 10*3/MM3 (ref 0.1–0.9)
MONOCYTES NFR BLD AUTO: 5.4 % (ref 5–12)
NEUTROPHILS # BLD AUTO: 5.71 10*3/MM3 (ref 1.7–7)
NEUTROPHILS NFR BLD AUTO: 71.3 % (ref 42.7–76)
NRBC BLD AUTO-RTO: 0 /100 WBC (ref 0–0.2)
PLATELET # BLD AUTO: 344 10*3/MM3 (ref 140–450)
POTASSIUM SERPL-SCNC: 4.2 MMOL/L (ref 3.5–5.2)
PROT SERPL-MCNC: 7.1 G/DL (ref 6–8.5)
RBC # BLD AUTO: 4.84 10*6/MM3 (ref 3.77–5.28)
SODIUM SERPL-SCNC: 138 MMOL/L (ref 136–145)
TRIGL SERPL-MCNC: 113 MG/DL (ref 0–150)
TSH SERPL DL<=0.005 MIU/L-ACNC: 1.71 MIU/ML (ref 0.27–4.2)
VLDLC SERPL CALC-MCNC: 22.6 MG/DL
WBC # BLD AUTO: 8.01 10*3/MM3 (ref 3.4–10.8)

## 2019-05-11 DIAGNOSIS — Z30.41 ENCOUNTER FOR SURVEILLANCE OF CONTRACEPTIVE PILLS: ICD-10-CM

## 2019-05-11 RX ORDER — DROSPIRENONE AND ETHINYL ESTRADIOL 0.03MG-3MG
KIT ORAL
Qty: 84 TABLET | Refills: 0 | Status: CANCELLED | OUTPATIENT
Start: 2019-05-11

## 2019-05-13 ENCOUNTER — OFFICE VISIT (OUTPATIENT)
Dept: INTERNAL MEDICINE | Facility: CLINIC | Age: 39
End: 2019-05-13

## 2019-05-13 VITALS
DIASTOLIC BLOOD PRESSURE: 88 MMHG | TEMPERATURE: 97.9 F | WEIGHT: 248 LBS | OXYGEN SATURATION: 99 % | BODY MASS INDEX: 39.86 KG/M2 | SYSTOLIC BLOOD PRESSURE: 134 MMHG | HEIGHT: 66 IN | HEART RATE: 79 BPM

## 2019-05-13 DIAGNOSIS — E66.9 OBESITY (BMI 30.0-34.9): ICD-10-CM

## 2019-05-13 DIAGNOSIS — Z79.899 HIGH RISK MEDICATION USE: ICD-10-CM

## 2019-05-13 DIAGNOSIS — F41.1 GENERALIZED ANXIETY DISORDER: ICD-10-CM

## 2019-05-13 DIAGNOSIS — Z00.00 HEALTH CARE MAINTENANCE: Primary | ICD-10-CM

## 2019-05-13 DIAGNOSIS — I10 BENIGN ESSENTIAL HYPERTENSION: ICD-10-CM

## 2019-05-13 DIAGNOSIS — Z30.41 ENCOUNTER FOR SURVEILLANCE OF CONTRACEPTIVE PILLS: ICD-10-CM

## 2019-05-13 DIAGNOSIS — E55.9 VITAMIN D DEFICIENCY: ICD-10-CM

## 2019-05-13 PROBLEM — E04.1 THYROID NODULE: Status: ACTIVE | Noted: 2019-05-13

## 2019-05-13 PROBLEM — E04.1 THYROID CYST: Status: ACTIVE | Noted: 2019-05-13

## 2019-05-13 PROBLEM — E04.1 THYROID NODULE: Status: RESOLVED | Noted: 2019-05-13 | Resolved: 2019-05-13

## 2019-05-13 PROCEDURE — 81025 URINE PREGNANCY TEST: CPT | Performed by: NURSE PRACTITIONER

## 2019-05-13 PROCEDURE — 93000 ELECTROCARDIOGRAM COMPLETE: CPT | Performed by: NURSE PRACTITIONER

## 2019-05-13 PROCEDURE — 99395 PREV VISIT EST AGE 18-39: CPT | Performed by: NURSE PRACTITIONER

## 2019-05-13 RX ORDER — LOSARTAN POTASSIUM 100 MG/1
100 TABLET ORAL DAILY
Qty: 90 TABLET | Refills: 2 | Status: SHIPPED | OUTPATIENT
Start: 2019-05-13 | End: 2019-09-09 | Stop reason: SDUPTHER

## 2019-05-13 RX ORDER — DROSPIRENONE AND ETHINYL ESTRADIOL 0.03MG-3MG
1 KIT ORAL DAILY
Qty: 84 TABLET | Refills: 2 | Status: SHIPPED | OUTPATIENT
Start: 2019-05-13 | End: 2020-01-17

## 2019-05-13 NOTE — PROGRESS NOTES
Julia Allred is a 38 y.o. female.     History of Present Illness   The patient is here today for CPE and lab work F/U. She is feeling well. Her 10 yr old daughter, fell off the top of a house boat, was unconscious in the water. Things have finally calmed down with this.     Obesity- has used Qsymia with some success in the past.   She does have the lap band and some days can't eat as much as recommended.  Was switched from qsymia to contrave in December. She did not try this due to cost.   Thyroid nodules- saw Dr. Jones, has mult cysts, pt reports should not cause any issues.     Pap- nl 17 and 16, abnormal about 10 years ago none since    Anxiety- much improved, uses buspar rarely now.   The following portions of the patient's history were reviewed and updated as appropriate: allergies, current medications, past family history, past medical history, past social history, past surgical history and problem list.    Review of Systems   Constitutional: Negative for chills, fatigue and fever.   HENT: Negative for ear pain, rhinorrhea and sore throat.    Eyes: Negative.    Respiratory: Negative for cough and shortness of breath.    Cardiovascular: Negative.    Gastrointestinal: Negative.    Endocrine: Negative for cold intolerance and heat intolerance.   Genitourinary: Negative for breast discharge, difficulty urinating, dyspareunia, dysuria, flank pain, frequency, genital sores, hematuria, pelvic pain, urgency, breast lump and breast pain.   Musculoskeletal: Negative.    Skin: Negative.    Allergic/Immunologic: Negative for environmental allergies and food allergies.   Neurological: Negative.    Hematological: Negative.    Psychiatric/Behavioral: Negative for dysphoric mood and suicidal ideas. The patient is nervous/anxious.        Objective   Physical Exam   Constitutional: She is oriented to person, place, and time. Vital signs are normal. She appears well-developed and well-nourished. She is  cooperative.   Body mass index is 39.43 kg/m².     HENT:   Right Ear: Hearing, tympanic membrane, external ear and ear canal normal.   Left Ear: Hearing, tympanic membrane, external ear and ear canal normal.   Nose: Nose normal.   Mouth/Throat: Uvula is midline, oropharynx is clear and moist and mucous membranes are normal.   Eyes: Conjunctivae, EOM and lids are normal. Pupils are equal, round, and reactive to light.   Neck: Trachea normal and normal range of motion. Carotid bruit is not present. No thyroid mass and no thyromegaly present.   Cardiovascular: Normal rate, regular rhythm, normal heart sounds and normal pulses.   Pulmonary/Chest: Effort normal and breath sounds normal. She exhibits no mass, no tenderness, no laceration, no crepitus, no edema, no deformity, no swelling and no retraction. Right breast exhibits no inverted nipple, no mass, no nipple discharge, no skin change and no tenderness. Left breast exhibits no inverted nipple, no mass, no nipple discharge, no skin change and no tenderness. Breasts are symmetrical. There is no breast swelling.   Abdominal: Soft. Normal appearance, normal aorta and bowel sounds are normal. There is no hepatosplenomegaly. There is no tenderness.   Musculoskeletal: Normal range of motion.   Lymphadenopathy:     She has no cervical adenopathy.     She has no axillary adenopathy. No inguinal adenopathy noted on the right or left side.        Right: No inguinal and no supraclavicular adenopathy present.        Left: No inguinal and no supraclavicular adenopathy present.   Neurological: She is alert and oriented to person, place, and time. She has normal strength. No cranial nerve deficit or sensory deficit. She displays a negative Romberg sign. GCS eye subscore is 4. GCS verbal subscore is 5. GCS motor subscore is 6.   Reflex Scores:       Patellar reflexes are 2+ on the right side and 2+ on the left side.  Skin: Skin is warm, dry and intact.   Psychiatric: She has a  normal mood and affect. Her speech is normal and behavior is normal. Judgment and thought content normal. Cognition and memory are normal.       Assessment/Plan   There are no diagnoses linked to this encounter.            HCM- continue to eat a healthy diet, increase activity and work on wt loss  1. Anxiety- ok to use Buspar, PRN  2. Vit D def- decrease from 5000 to 2000 IU daily  3. Obesity- check EKG today, Hammad CSA today, aware of approp use and SEs. Wt loss goal of 5 lbs, follow up in 6 weeks, track food, urine HCG today, discussed 2 types of BC recommend    Pap- 2020  Wears suncsreen

## 2019-05-16 ENCOUNTER — TELEPHONE (OUTPATIENT)
Dept: INTERNAL MEDICINE | Facility: CLINIC | Age: 39
End: 2019-05-16

## 2019-05-16 NOTE — TELEPHONE ENCOUNTER
MISHA  Discussed insurance coverage with patient.  Patient informs me that her great maternal aunt has a history of thyroid cancer.  We cannot use Saxenda due to this.  She is planning on taking the 2 weeks free of the Qsymia and then looking into cash pay prices.  She is aware we could try Belviq and discussed side effects with this.  She will call if she wants to start this.    ----- Message from Carson Partida sent at 5/16/2019  8:16 AM EDT -----  PA denied. Preferred formulary products are: Belviq, Belviq XR, Saxenda.    Or you can appeal this.  ----- Message -----  From: Indu Horne APRN  Sent: 5/15/2019   3:45 PM  To: Carson Partida    She has done well with qsymia in the past. Has she already been able to get it with the coupon card?  ----- Message -----  From: Carson Partida  Sent: 5/15/2019   1:20 PM  To: DERRICK Jim    The patient's drug benefit plan provides coverage for other drugs which may be considered for treating your patient. Can your patient's treatment be switched to a formulary drug? [If yes, provide your patient with a new prescription for the preferred product.] Available Formulary Alternatives: BELVIQ, BELVIQ XR, SAXENDA

## 2019-06-26 ENCOUNTER — OFFICE VISIT (OUTPATIENT)
Dept: INTERNAL MEDICINE | Facility: CLINIC | Age: 39
End: 2019-06-26

## 2019-06-26 VITALS
SYSTOLIC BLOOD PRESSURE: 114 MMHG | BODY MASS INDEX: 39.1 KG/M2 | HEIGHT: 66 IN | TEMPERATURE: 97.7 F | HEART RATE: 94 BPM | DIASTOLIC BLOOD PRESSURE: 80 MMHG | OXYGEN SATURATION: 99 % | WEIGHT: 243.3 LBS

## 2019-06-26 DIAGNOSIS — I10 BENIGN ESSENTIAL HYPERTENSION: Primary | ICD-10-CM

## 2019-06-26 DIAGNOSIS — E66.9 OBESITY (BMI 30.0-34.9): ICD-10-CM

## 2019-06-26 PROCEDURE — 99213 OFFICE O/P EST LOW 20 MIN: CPT | Performed by: NURSE PRACTITIONER

## 2019-06-26 NOTE — PROGRESS NOTES
Julia Allred is a 38 y.o. female.      History of Present Illness   The patient is here today to F/U on obesity and anxiety.   Obesity- hx of lap band sx, Qsymia restarted 5/13th, initial wt was 248lbs, pt is down 5 lbs.   Not exercising right now but just had her pool finished today.     HTN-Pt is doing well with current medication regimen, denies adverse reactions, compliant with medication schedule.   The following portions of the patient's history were reviewed and updated as appropriate: allergies, current medications, past family history, past medical history, past social history, past surgical history and problem list.    Review of Systems   Constitutional: Negative for chills and fever.   Respiratory: Negative.    Cardiovascular: Negative.    Psychiatric/Behavioral: Negative for dysphoric mood, sleep disturbance and suicidal ideas. The patient is not nervous/anxious.        Objective   Physical Exam   Constitutional: She appears well-developed and well-nourished.   Neck: Normal range of motion. Neck supple. No thyromegaly present.   Cardiovascular: Normal rate, regular rhythm, normal heart sounds and intact distal pulses.   Pulmonary/Chest: Effort normal and breath sounds normal.   Lymphadenopathy:     She has no cervical adenopathy.   Skin: Skin is warm and dry.   Psychiatric: She has a normal mood and affect. Her behavior is normal. Judgment and thought content normal.       Assessment/Plan   There are no diagnoses linked to this encounter.         1. HTN- well controlled, call syst <110  2. Obesity- continue to track food, watch fat intake, increase activity, re-eval in 6 weeks, continue Qsymia at current dose for now. Wt loss goal 5 lbs. Aware of approp use and SEs.

## 2019-08-09 DIAGNOSIS — I10 BENIGN ESSENTIAL HYPERTENSION: ICD-10-CM

## 2019-08-09 RX ORDER — LOSARTAN POTASSIUM 100 MG/1
TABLET ORAL
Qty: 90 TABLET | Refills: 2 | OUTPATIENT
Start: 2019-08-09

## 2019-09-09 ENCOUNTER — OFFICE VISIT (OUTPATIENT)
Dept: INTERNAL MEDICINE | Facility: CLINIC | Age: 39
End: 2019-09-09

## 2019-09-09 VITALS
OXYGEN SATURATION: 99 % | WEIGHT: 249.7 LBS | BODY MASS INDEX: 40.13 KG/M2 | DIASTOLIC BLOOD PRESSURE: 88 MMHG | HEIGHT: 66 IN | TEMPERATURE: 98.4 F | HEART RATE: 96 BPM | SYSTOLIC BLOOD PRESSURE: 134 MMHG

## 2019-09-09 DIAGNOSIS — E66.9 OBESITY (BMI 30.0-34.9): ICD-10-CM

## 2019-09-09 DIAGNOSIS — I10 BENIGN ESSENTIAL HYPERTENSION: Primary | ICD-10-CM

## 2019-09-09 PROCEDURE — 99213 OFFICE O/P EST LOW 20 MIN: CPT | Performed by: NURSE PRACTITIONER

## 2019-09-09 RX ORDER — LOSARTAN POTASSIUM 100 MG/1
100 TABLET ORAL DAILY
Qty: 90 TABLET | Refills: 2 | Status: SHIPPED | OUTPATIENT
Start: 2019-09-09 | End: 2020-06-30

## 2019-09-09 NOTE — PROGRESS NOTES
Julia Allred is a 39 y.o. female.      History of Present Illness   The patient is here today to F/U on HTN and obesity. Not tracking food, getting fast food. Trying to take care of everyone else. Struggling some.     Obesity- hx of lap band, up 6 lbs from last visit, pt is actually not taking this regularly, trying to stretch pills out, not taking daily.     Zohra is having headaches again, has hx of concussion.  Dad is dying, does not have a close relationship. Sister had a baby.   The following portions of the patient's history were reviewed and updated as appropriate: allergies, current medications, past family history, past medical history, past social history, past surgical history and problem list.    Review of Systems   Constitutional: Negative for chills and fever.   Respiratory: Negative.    Cardiovascular: Negative.    Psychiatric/Behavioral: Positive for sleep disturbance. Negative for dysphoric mood and suicidal ideas. The patient is nervous/anxious.        Objective   Physical Exam   Constitutional: She appears well-developed and well-nourished.   Neck: Normal range of motion. Neck supple. No thyromegaly present.   Cardiovascular: Normal rate, regular rhythm, normal heart sounds and intact distal pulses.   Pulmonary/Chest: Effort normal and breath sounds normal.   Lymphadenopathy:     She has no cervical adenopathy.   Skin: Skin is warm and dry.   Psychiatric: She has a normal mood and affect. Her behavior is normal. Judgment and thought content normal.       Assessment/Plan   There are no diagnoses linked to this encounter.         1. HTN- well controlled  2. Obesity- continue current dose, track food, return to gym, 4 week f/u, wt loss goal of 5 lbs.

## 2019-10-03 ENCOUNTER — OFFICE VISIT (OUTPATIENT)
Dept: INTERNAL MEDICINE | Facility: CLINIC | Age: 39
End: 2019-10-03

## 2019-10-03 VITALS
SYSTOLIC BLOOD PRESSURE: 120 MMHG | HEIGHT: 66 IN | TEMPERATURE: 97.6 F | OXYGEN SATURATION: 98 % | WEIGHT: 238.7 LBS | DIASTOLIC BLOOD PRESSURE: 80 MMHG | BODY MASS INDEX: 38.36 KG/M2 | HEART RATE: 81 BPM

## 2019-10-03 DIAGNOSIS — T75.3XXA MOTION SICKNESS, INITIAL ENCOUNTER: ICD-10-CM

## 2019-10-03 DIAGNOSIS — E66.9 OBESITY (BMI 30.0-34.9): ICD-10-CM

## 2019-10-03 DIAGNOSIS — I10 BENIGN ESSENTIAL HYPERTENSION: ICD-10-CM

## 2019-10-03 DIAGNOSIS — J06.9 UPPER RESPIRATORY TRACT INFECTION, UNSPECIFIED TYPE: Primary | ICD-10-CM

## 2019-10-03 PROCEDURE — 99214 OFFICE O/P EST MOD 30 MIN: CPT | Performed by: NURSE PRACTITIONER

## 2019-10-03 RX ORDER — ONDANSETRON 8 MG/1
8 TABLET, ORALLY DISINTEGRATING ORAL EVERY 8 HOURS PRN
Qty: 10 TABLET | Refills: 1 | Status: SHIPPED | OUTPATIENT
Start: 2019-10-03 | End: 2020-05-14

## 2019-10-03 NOTE — PROGRESS NOTES
Julia Allred is a 39 y.o. female.      History of Present Illness   The patient is here today to F/U on HTN and obesity.   Qsymia restarted 5/2019, initial wt was 248 lbs, down 11 lbs from 4 weeks prior. She is now doing hello fresh. She is also doing some protein shakes in the am. Taking Qsymia daily, denies SEs.     Still really busy. Worked out for 2 weeks.     URI last week. Feeling better but still lots of drainage. Doing sudafed and mucinex with some relief. Some cough, dry.     Notes some car rides make her nauseated. Would like some zofran.     Dad is dying, fell and broke his hip. Not a great relationship.   Kids 15 and 10. Zohra with hx of concussion.  The following portions of the patient's history were reviewed and updated as appropriate: allergies, current medications, past family history, past medical history, past social history, past surgical history and problem list.    Review of Systems   Constitutional: Negative for chills and fever.   HENT: Positive for congestion, postnasal drip and rhinorrhea.    Respiratory: Positive for cough. Negative for shortness of breath and wheezing.    Cardiovascular: Negative.    Psychiatric/Behavioral: Negative for dysphoric mood and suicidal ideas. The patient is not nervous/anxious.        Objective   Physical Exam   Constitutional: She appears well-developed and well-nourished.   HENT:   Right Ear: Hearing, external ear and ear canal normal. A middle ear effusion (trace serous) is present.   Left Ear: Hearing, tympanic membrane, external ear and ear canal normal.   Nose: Nose normal.   Mouth/Throat: Uvula is midline, oropharynx is clear and moist and mucous membranes are normal.   Neck: Normal range of motion. Neck supple. No thyromegaly present.   Cardiovascular: Normal rate, regular rhythm, normal heart sounds and intact distal pulses.   Pulmonary/Chest: Effort normal and breath sounds normal.   Lymphadenopathy:     She has no cervical  adenopathy.   Skin: Skin is warm and dry.   Psychiatric: She has a normal mood and affect. Her behavior is normal. Judgment and thought content normal.       Assessment/Plan   There are no diagnoses linked to this encounter.           1. URI- add in benadryl and NSS, continue mucinex  2. HTN- continue same  3. Obesity- keep tracking, continue food delivery, work on exercise, continue the qsymia. F/U in 6 weeks, wt loss goal of 5-6 lbs.   4. Motion sickness/Nausea-  Ok for zofran

## 2019-11-03 ENCOUNTER — RESULTS ENCOUNTER (OUTPATIENT)
Dept: INTERNAL MEDICINE | Facility: CLINIC | Age: 39
End: 2019-11-03

## 2019-11-03 DIAGNOSIS — T75.3XXA MOTION SICKNESS, INITIAL ENCOUNTER: ICD-10-CM

## 2019-11-03 DIAGNOSIS — E66.9 OBESITY (BMI 30.0-34.9): ICD-10-CM

## 2019-11-03 DIAGNOSIS — I10 BENIGN ESSENTIAL HYPERTENSION: ICD-10-CM

## 2019-11-03 DIAGNOSIS — J06.9 UPPER RESPIRATORY TRACT INFECTION, UNSPECIFIED TYPE: ICD-10-CM

## 2019-11-10 DIAGNOSIS — E66.9 OBESITY (BMI 30.0-34.9): ICD-10-CM

## 2019-11-11 RX ORDER — PHENTERMINE AND TOPIRAMATE 7.5; 46 MG/1; MG/1
CAPSULE, EXTENDED RELEASE ORAL
Qty: 30 CAPSULE | Refills: 0 | Status: SHIPPED | OUTPATIENT
Start: 2019-11-11 | End: 2019-12-11 | Stop reason: SDUPTHER

## 2019-12-10 LAB
ALBUMIN SERPL-MCNC: 3.9 G/DL (ref 3.5–5.2)
ALBUMIN/GLOB SERPL: 1.3 G/DL
ALP SERPL-CCNC: 105 U/L (ref 39–117)
ALT SERPL-CCNC: 17 U/L (ref 1–33)
AST SERPL-CCNC: 13 U/L (ref 1–32)
BILIRUB SERPL-MCNC: 0.4 MG/DL (ref 0.2–1.2)
BUN SERPL-MCNC: 12 MG/DL (ref 6–20)
BUN/CREAT SERPL: 15.2 (ref 7–25)
CALCIUM SERPL-MCNC: 9.2 MG/DL (ref 8.6–10.5)
CHLORIDE SERPL-SCNC: 102 MMOL/L (ref 98–107)
CHOLEST SERPL-MCNC: 249 MG/DL (ref 0–200)
CO2 SERPL-SCNC: 23.3 MMOL/L (ref 22–29)
CREAT SERPL-MCNC: 0.79 MG/DL (ref 0.57–1)
GLOBULIN SER CALC-MCNC: 2.9 GM/DL
GLUCOSE SERPL-MCNC: 91 MG/DL (ref 65–99)
HDLC SERPL-MCNC: 83 MG/DL (ref 40–60)
LDLC SERPL CALC-MCNC: 132 MG/DL (ref 0–100)
LDLC/HDLC SERPL: 1.59 {RATIO}
POTASSIUM SERPL-SCNC: 4.1 MMOL/L (ref 3.5–5.2)
PROT SERPL-MCNC: 6.8 G/DL (ref 6–8.5)
SODIUM SERPL-SCNC: 139 MMOL/L (ref 136–145)
TRIGL SERPL-MCNC: 170 MG/DL (ref 0–150)
VLDLC SERPL CALC-MCNC: 34 MG/DL

## 2019-12-11 ENCOUNTER — OFFICE VISIT (OUTPATIENT)
Dept: INTERNAL MEDICINE | Facility: CLINIC | Age: 39
End: 2019-12-11

## 2019-12-11 VITALS
SYSTOLIC BLOOD PRESSURE: 124 MMHG | OXYGEN SATURATION: 98 % | WEIGHT: 232.5 LBS | TEMPERATURE: 97.9 F | DIASTOLIC BLOOD PRESSURE: 80 MMHG | HEIGHT: 66 IN | BODY MASS INDEX: 37.37 KG/M2 | HEART RATE: 98 BPM

## 2019-12-11 DIAGNOSIS — E66.9 OBESITY (BMI 30.0-34.9): ICD-10-CM

## 2019-12-11 DIAGNOSIS — F43.21 GRIEF: ICD-10-CM

## 2019-12-11 DIAGNOSIS — K21.9 GASTROESOPHAGEAL REFLUX DISEASE, ESOPHAGITIS PRESENCE NOT SPECIFIED: ICD-10-CM

## 2019-12-11 DIAGNOSIS — J01.11 ACUTE RECURRENT FRONTAL SINUSITIS: ICD-10-CM

## 2019-12-11 DIAGNOSIS — I10 BENIGN ESSENTIAL HYPERTENSION: Primary | ICD-10-CM

## 2019-12-11 PROCEDURE — 90471 IMMUNIZATION ADMIN: CPT | Performed by: NURSE PRACTITIONER

## 2019-12-11 PROCEDURE — 90674 CCIIV4 VAC NO PRSV 0.5 ML IM: CPT | Performed by: NURSE PRACTITIONER

## 2019-12-11 PROCEDURE — 99214 OFFICE O/P EST MOD 30 MIN: CPT | Performed by: NURSE PRACTITIONER

## 2019-12-11 NOTE — PATIENT INSTRUCTIONS
Gastroesophageal Reflux Disease, Adult  Gastroesophageal reflux (TIMOTHY) happens when acid from the stomach flows up into the tube that connects the mouth and the stomach (esophagus). Normally, food travels down the esophagus and stays in the stomach to be digested. However, when a person has TIMOTHY, food and stomach acid sometimes move back up into the esophagus. If this becomes a more serious problem, the person may be diagnosed with a disease called gastroesophageal reflux disease (GERD). GERD occurs when the reflux:  · Happens often.  · Causes frequent or severe symptoms.  · Causes problems such as damage to the esophagus.  When stomach acid comes in contact with the esophagus, the acid may cause soreness (inflammation) in the esophagus. Over time, GERD may create small holes (ulcers) in the lining of the esophagus.  What are the causes?  This condition is caused by a problem with the muscle between the esophagus and the stomach (lower esophageal sphincter, or LES). Normally, the LES muscle closes after food passes through the esophagus to the stomach. When the LES is weakened or abnormal, it does not close properly, and that allows food and stomach acid to go back up into the esophagus.  The LES can be weakened by certain dietary substances, medicines, and medical conditions, including:  · Tobacco use.  · Pregnancy.  · Having a hiatal hernia.  · Alcohol use.  · Certain foods and beverages, such as coffee, chocolate, onions, and peppermint.  What increases the risk?  You are more likely to develop this condition if you:  · Have an increased body weight.  · Have a connective tissue disorder.  · Use NSAID medicines.  What are the signs or symptoms?  Symptoms of this condition include:  · Heartburn.  · Difficult or painful swallowing.  · The feeling of having a lump in the throat.  · A bitter taste in the mouth.  · Bad breath.  · Having a large amount of saliva.  · Having an upset or bloated  stomach.  · Belching.  · Chest pain. Different conditions can cause chest pain. Make sure you see your health care provider if you experience chest pain.  · Shortness of breath or wheezing.  · Ongoing (chronic) cough or a night-time cough.  · Wearing away of tooth enamel.  · Weight loss.  How is this diagnosed?  Your health care provider will take a medical history and perform a physical exam. To determine if you have mild or severe GERD, your health care provider may also monitor how you respond to treatment. You may also have tests, including:  · A test to examine your stomach and esophagus with a small camera (endoscopy).  · A test that measures the acidity level in your esophagus.  · A test that measures how much pressure is on your esophagus.  · A barium swallow or modified barium swallow test to show the shape, size, and functioning of your esophagus.  How is this treated?  The goal of treatment is to help relieve your symptoms and to prevent complications. Treatment for this condition may vary depending on how severe your symptoms are. Your health care provider may recommend:  · Changes to your diet.  · Medicine.  · Surgery.  Follow these instructions at home:  Eating and drinking    · Follow a diet as recommended by your health care provider. This may involve avoiding foods and drinks such as:  ? Coffee and tea (with or without caffeine).  ? Drinks that contain alcohol.  ? Energy drinks and sports drinks.  ? Carbonated drinks or sodas.  ? Chocolate and cocoa.  ? Peppermint and mint flavorings.  ? Garlic and onions.  ? Horseradish.  ? Spicy and acidic foods, including peppers, chili powder, franco powder, vinegar, hot sauces, and barbecue sauce.  ? Citrus fruit juices and citrus fruits, such as oranges, renuka, and limes.  ? Tomato-based foods, such as red sauce, chili, salsa, and pizza with red sauce.  ? Fried and fatty foods, such as donuts, french fries, potato chips, and high-fat dressings.  ? High-fat  meats, such as hot dogs and fatty cuts of red and white meats, such as rib eye steak, sausage, ham, and burks.  ? High-fat dairy items, such as whole milk, butter, and cream cheese.  · Eat small, frequent meals instead of large meals.  · Avoid drinking large amounts of liquid with your meals.  · Avoid eating meals during the 2-3 hours before bedtime.  · Avoid lying down right after you eat.  · Do not exercise right after you eat.  Lifestyle    · Do not use any products that contain nicotine or tobacco, such as cigarettes, e-cigarettes, and chewing tobacco. If you need help quitting, ask your health care provider.  · Try to reduce your stress by using methods such as yoga or meditation. If you need help reducing stress, ask your health care provider.  · If you are overweight, reduce your weight to an amount that is healthy for you. Ask your health care provider for guidance about a safe weight loss goal.  General instructions  · Pay attention to any changes in your symptoms.  · Take over-the-counter and prescription medicines only as told by your health care provider. Do not take aspirin, ibuprofen, or other NSAIDs unless your health care provider told you to do so.  · Wear loose-fitting clothing. Do not wear anything tight around your waist that causes pressure on your abdomen.  · Raise (elevate) the head of your bed about 6 inches (15 cm).  · Avoid bending over if this makes your symptoms worse.  · Keep all follow-up visits as told by your health care provider. This is important.  Contact a health care provider if:  · You have:  ? New symptoms.  ? Unexplained weight loss.  ? Difficulty swallowing or it hurts to swallow.  ? Wheezing or a persistent cough.  ? A hoarse voice.  · Your symptoms do not improve with treatment.  Get help right away if you:  · Have pain in your arms, neck, jaw, teeth, or back.  · Feel sweaty, dizzy, or light-headed.  · Have chest pain or shortness of breath.  · Vomit and your vomit looks  like blood or coffee grounds.  · Faint.  · Have stool that is bloody or black.  · Cannot swallow, drink, or eat.  Summary  · Gastroesophageal reflux happens when acid from the stomach flows up into the esophagus. GERD is a disease in which the reflux happens often, causes frequent or severe symptoms, or causes problems such as damage to the esophagus.  · Treatment for this condition may vary depending on how severe your symptoms are. Your health care provider may recommend diet and lifestyle changes, medicine, or surgery.  · Contact a health care provider if you have new or worsening symptoms.  · Take over-the-counter and prescription medicines only as told by your health care provider. Do not take aspirin, ibuprofen, or other NSAIDs unless your health care provider told you to do so.  · Keep all follow-up visits as told by your health care provider. This is important.  This information is not intended to replace advice given to you by your health care provider. Make sure you discuss any questions you have with your health care provider.  Document Released: 09/27/2006 Document Revised: 06/26/2019 Document Reviewed: 06/26/2019  [employer] Interactive Patient Education © 2019 [employer]

## 2019-12-11 NOTE — PROGRESS NOTES
Julia Allred is a 39 y.o. female.      History of Present Illness   The patient is here today to F/U on labs, HTN and obesity.   Obesity- Qsymia restarted 2019, initial wt was 248 lbs, down 6 lbs from 6 weeks prior.  Has had to eat hospital food lately due to Dad. She is returning to her hello fresh meals.     Sinuses worse lately, nose is running. Sinus pressure headache. Better with sudafed. NSS makes it worse.     Dad  . Not a great relationship. She is working on his estate.   Kids 15 and 10. Zohra with hx of concussion.  The following portions of the patient's history were reviewed and updated as appropriate: allergies, current medications, past family history, past medical history, past social history, past surgical history and problem list.    Review of Systems   Constitutional: Negative.    HENT: Positive for congestion, rhinorrhea and sinus pressure.    Respiratory: Negative.    Cardiovascular: Negative.    Gastrointestinal: Positive for nausea and indigestion.   Neurological: Positive for headache.       Objective   Physical Exam   Constitutional: She appears well-developed and well-nourished.   HENT:   Right Ear: Hearing, tympanic membrane, external ear and ear canal normal.   Left Ear: Hearing, external ear and ear canal normal. Tympanic membrane is retracted.   Nose: Septal deviation present. Right sinus exhibits no maxillary sinus tenderness and no frontal sinus tenderness. Left sinus exhibits no maxillary sinus tenderness and no frontal sinus tenderness.   Mouth/Throat: Uvula is midline, oropharynx is clear and moist and mucous membranes are normal.   Neck: Normal range of motion. Neck supple. No thyromegaly present.   Cardiovascular: Normal rate, regular rhythm, normal heart sounds and intact distal pulses.   Pulmonary/Chest: Effort normal and breath sounds normal.   Lymphadenopathy:     She has no cervical adenopathy.   Skin: Skin is warm and dry.   Psychiatric:  She has a normal mood and affect. Her behavior is normal. Judgment and thought content normal.       Vitals:    12/11/19 0934   BP: 124/80   Pulse: 98   Temp: 97.9 °F (36.6 °C)   SpO2: 98%     Body mass index is 36.97 kg/m².      Assessment/Plan   Mirian was seen today for obesity and headache.    Diagnoses and all orders for this visit:    Benign essential hypertension    Grief    Acute recurrent frontal sinusitis    Obesity (BMI 30.0-34.9)    Gastroesophageal reflux disease, esophagitis presence not specified               1. HTN- running well continue same  2. Grief- recommend hosparus counseling  3. Sinusitis- ok for ENT eval, pt reports allergy meds make it worse  4. Obesity- in January pt will take 2 week break, Restart end of January. 2nd week of February increase to 11 mg dose with office visit.   4. GERD- start pepcid or prilosec, low acid diet.     Flu vaccine- today

## 2020-01-17 DIAGNOSIS — Z30.41 ENCOUNTER FOR SURVEILLANCE OF CONTRACEPTIVE PILLS: ICD-10-CM

## 2020-01-17 RX ORDER — DROSPIRENONE AND ETHINYL ESTRADIOL 0.03MG-3MG
KIT ORAL
Qty: 84 TABLET | Refills: 2 | Status: SHIPPED | OUTPATIENT
Start: 2020-01-17 | End: 2020-10-05

## 2020-02-19 ENCOUNTER — OFFICE VISIT (OUTPATIENT)
Dept: INTERNAL MEDICINE | Facility: CLINIC | Age: 40
End: 2020-02-19

## 2020-02-19 VITALS
OXYGEN SATURATION: 99 % | WEIGHT: 235.8 LBS | BODY MASS INDEX: 37.9 KG/M2 | TEMPERATURE: 97.7 F | HEART RATE: 86 BPM | HEIGHT: 66 IN | DIASTOLIC BLOOD PRESSURE: 80 MMHG | SYSTOLIC BLOOD PRESSURE: 122 MMHG

## 2020-02-19 DIAGNOSIS — E66.9 OBESITY (BMI 30.0-34.9): ICD-10-CM

## 2020-02-19 DIAGNOSIS — I10 BENIGN ESSENTIAL HYPERTENSION: Primary | ICD-10-CM

## 2020-02-19 PROCEDURE — 99213 OFFICE O/P EST LOW 20 MIN: CPT | Performed by: NURSE PRACTITIONER

## 2020-02-19 NOTE — PROGRESS NOTES
Subjective   Mirian Allred is a 39 y.o. female.      History of Present Illness   The patient is here today to F/U on HTN and obesity.   Obesity- Qsymia restarted 5/2019, initial wt was 248 lbs, down 6 lbs from 6 weeks prior. Pt took 2 week break in January. She reports overall down a few lbs from holidays. Denies AR to Qsymia has been on for 3-4 weeks.     Dtr recent gluten allergy. Doing some hello fresh.     Dealing with Dad's estate, did not have a good relationship. Has done some hosparus counseling.   The following portions of the patient's history were reviewed and updated as appropriate: allergies, current medications, past family history, past medical history, past social history, past surgical history and problem list.    Review of Systems   Constitutional: Negative for chills and fever.   Respiratory: Negative.    Cardiovascular: Negative.    Psychiatric/Behavioral: Positive for stress. Negative for dysphoric mood and suicidal ideas. The patient is not nervous/anxious.        Objective   Physical Exam   Constitutional: She appears well-developed and well-nourished.   Neck: Normal range of motion. Neck supple. No thyromegaly present.   Cardiovascular: Normal rate, regular rhythm, normal heart sounds and intact distal pulses.   Pulmonary/Chest: Effort normal and breath sounds normal.   Lymphadenopathy:     She has no cervical adenopathy.   Skin: Skin is warm and dry.   Psychiatric: She has a normal mood and affect. Her behavior is normal. Judgment and thought content normal.       Vitals:    02/19/20 1010   BP: 122/80   Pulse: 86   Temp: 97.7 °F (36.5 °C)   SpO2: 99%     Body mass index is 37.49 kg/m².      Assessment/Plan   Mirian was seen today for obesity.    Diagnoses and all orders for this visit:    Benign essential hypertension    Obesity (BMI 30.0-34.9)  -     Phentermine-Topiramate (QSYMIA) 11.25-69 MG capsule sustained-release 24 hr; Take 1 capsule by mouth Daily.               1. HTN-  well controlled  2. Obesity- increase Qsymia to 11mg dose, calorie track and get back to the gym, wt loss goal of 3-4 lbs in the next month

## 2020-03-17 DIAGNOSIS — E66.9 OBESITY (BMI 30.0-34.9): ICD-10-CM

## 2020-03-17 NOTE — TELEPHONE ENCOUNTER
Qsymia refill request    LOV - 2/19/20  Last refill - 2/19/20  [facility]    Patient cancelled her appt due to coronavirus, but needs refill. She said she has high risk family members and prefers to stay away from sick people.

## 2020-05-11 DIAGNOSIS — E66.9 OBESITY (BMI 30.0-34.9): ICD-10-CM

## 2020-05-14 ENCOUNTER — TELEMEDICINE (OUTPATIENT)
Dept: INTERNAL MEDICINE | Facility: CLINIC | Age: 40
End: 2020-05-14

## 2020-05-14 VITALS — WEIGHT: 234 LBS | BODY MASS INDEX: 37.21 KG/M2

## 2020-05-14 DIAGNOSIS — E55.9 VITAMIN D DEFICIENCY: ICD-10-CM

## 2020-05-14 DIAGNOSIS — E66.9 OBESITY (BMI 30.0-34.9): ICD-10-CM

## 2020-05-14 DIAGNOSIS — E78.5 HYPERLIPIDEMIA, UNSPECIFIED HYPERLIPIDEMIA TYPE: ICD-10-CM

## 2020-05-14 DIAGNOSIS — I10 ESSENTIAL HYPERTENSION: Primary | ICD-10-CM

## 2020-05-14 PROCEDURE — 99213 OFFICE O/P EST LOW 20 MIN: CPT | Performed by: NURSE PRACTITIONER

## 2020-05-14 NOTE — PROGRESS NOTES
Julia Allred is a 39 y.o. female.     History of Present Illness    The patient is here today to F/U on HTN and obesity.   COVID is stressful.   Anxiety- forgets to take buspar  Obesity- taking 11 mg dose.  Down in 1 lbs in the last 3 months. No access to gym. She is doing some walking and yard work. She is stress eating. She is cooking at home.   HTN- has not been checking her BP    Has taken sudafed a few times recently due to sinus pressure.   The following portions of the patient's history were reviewed and updated as appropriate: allergies, current medications, past family history, past medical history, past social history, past surgical history and problem list.    Review of Systems   Constitutional: Negative for chills and fever.   HENT: Positive for sinus pressure.    Respiratory: Negative.    Cardiovascular: Negative.    Psychiatric/Behavioral: Negative for dysphoric mood and suicidal ideas. The patient is nervous/anxious.        Objective   Physical Exam   Constitutional: She appears well-developed and well-nourished.   Pulmonary/Chest: Effort normal and breath sounds normal.   Psychiatric: She has a normal mood and affect. Her speech is normal and behavior is normal. Judgment and thought content normal. Cognition and memory are normal.       There were no vitals filed for this visit.  Body mass index is 37.21 kg/m².      Assessment/Plan   Diagnoses and all orders for this visit:    Essential hypertension  -     CBC & Differential; Future  -     Comprehensive Metabolic Panel; Future  -     Hepatic Function Panel; Future  -     Lipid Panel With LDL / HDL Ratio; Future  -     TSH Rfx On Abnormal To Free T4; Future  -     Hemoglobin A1c; Future    Obesity (BMI 30.0-34.9)  -     Phentermine-Topiramate (Qsymia) 7.5-46 MG capsule sustained-release 24 hr; Take 1 capsule by mouth Daily.  -     CBC & Differential; Future  -     Comprehensive Metabolic Panel; Future  -     Hepatic Function  Panel; Future  -     Lipid Panel With LDL / HDL Ratio; Future  -     TSH Rfx On Abnormal To Free T4; Future  -     Hemoglobin A1c; Future    Vitamin D deficiency  -     Vitamin D 25 Hydroxy; Future    Hyperlipidemia, unspecified hyperlipidemia type  -     CBC & Differential; Future  -     Comprehensive Metabolic Panel; Future  -     Hepatic Function Panel; Future  -     Lipid Panel With LDL / HDL Ratio; Future  -     TSH Rfx On Abnormal To Free T4; Future  -     Hemoglobin A1c; Future          Visit completed via video today.        1. HTN- start checking BP once weekly.   2. Obesity- decrease dose of qsymia to 7.5 mg daily and then wean off for a few weeks. Food track, return to exercise and check.   Take qsymia daily X3 weeks then every other day X2 weeks to wean. Off for 2-3 weeks for break.   Wt loss goal of 6lbs.   Fasting labs and check up early July .   3. Anxiety- reminded pt she could take Buspar PRN, exercise will help as well.

## 2020-05-14 NOTE — PATIENT INSTRUCTIONS
1. HTN- start checking BP once weekly.   2. Obesity- decrease dose of qsymia to 7.5 mg daily and then wean off for a few weeks. Food track, return to exercise and check.   Take qsymia daily X3 weeks then every other day X2 weeks to wean. Off for 2-3 weeks for break.   Wt loss goal of 6lbs.   Fasting labs and check up early July .   3. Anxiety- reminded pt she could take Buspar PRN, exercise will help as well.

## 2020-06-14 ENCOUNTER — RESULTS ENCOUNTER (OUTPATIENT)
Dept: INTERNAL MEDICINE | Facility: CLINIC | Age: 40
End: 2020-06-14

## 2020-06-14 DIAGNOSIS — E55.9 VITAMIN D DEFICIENCY: ICD-10-CM

## 2020-06-14 DIAGNOSIS — I10 ESSENTIAL HYPERTENSION: ICD-10-CM

## 2020-06-14 DIAGNOSIS — E66.9 OBESITY (BMI 30.0-34.9): ICD-10-CM

## 2020-06-14 DIAGNOSIS — E78.5 HYPERLIPIDEMIA, UNSPECIFIED HYPERLIPIDEMIA TYPE: ICD-10-CM

## 2020-06-30 DIAGNOSIS — I10 BENIGN ESSENTIAL HYPERTENSION: ICD-10-CM

## 2020-06-30 RX ORDER — LOSARTAN POTASSIUM 100 MG/1
TABLET ORAL
Qty: 90 TABLET | Refills: 1 | Status: SHIPPED | OUTPATIENT
Start: 2020-06-30 | End: 2021-01-04

## 2020-10-01 DIAGNOSIS — Z30.41 ENCOUNTER FOR SURVEILLANCE OF CONTRACEPTIVE PILLS: ICD-10-CM

## 2020-10-05 RX ORDER — DROSPIRENONE AND ETHINYL ESTRADIOL 0.03MG-3MG
KIT ORAL
Qty: 84 TABLET | Refills: 1 | Status: SHIPPED | OUTPATIENT
Start: 2020-10-05 | End: 2021-04-14

## 2021-01-04 DIAGNOSIS — I10 BENIGN ESSENTIAL HYPERTENSION: ICD-10-CM

## 2021-01-04 RX ORDER — LOSARTAN POTASSIUM 100 MG/1
TABLET ORAL
Qty: 30 TABLET | Refills: 0 | Status: SHIPPED | OUTPATIENT
Start: 2021-01-04 | End: 2021-02-22 | Stop reason: SDUPTHER

## 2021-02-15 DIAGNOSIS — I10 BENIGN ESSENTIAL HYPERTENSION: ICD-10-CM

## 2021-02-16 RX ORDER — LOSARTAN POTASSIUM 100 MG/1
TABLET ORAL
Qty: 30 TABLET | OUTPATIENT
Start: 2021-02-16

## 2021-02-22 DIAGNOSIS — I10 BENIGN ESSENTIAL HYPERTENSION: ICD-10-CM

## 2021-02-22 NOTE — ADDENDUM NOTE
Addended by: CRISTINA DIAMOND on: 12/11/2019 10:42 AM     Modules accepted: Orders    
Addended by: NATALI AGUILAR on: 12/11/2019 10:09 AM     Modules accepted: Orders    
DISPLAY PLAN FREE TEXT

## 2021-02-23 RX ORDER — LOSARTAN POTASSIUM 100 MG/1
100 TABLET ORAL DAILY
Qty: 30 TABLET | Refills: 0 | Status: SHIPPED | OUTPATIENT
Start: 2021-02-23

## 2021-03-19 DIAGNOSIS — I10 BENIGN ESSENTIAL HYPERTENSION: ICD-10-CM

## 2021-03-19 RX ORDER — LOSARTAN POTASSIUM 100 MG/1
TABLET ORAL
Qty: 30 TABLET | Refills: 0 | OUTPATIENT
Start: 2021-03-19

## 2021-04-09 ENCOUNTER — HOSPITAL ENCOUNTER (OUTPATIENT)
Dept: GENERAL RADIOLOGY | Facility: HOSPITAL | Age: 41
Discharge: HOME | End: 2021-04-09
Admitting: FAMILY MEDICINE

## 2021-04-09 ENCOUNTER — TRANSCRIBE ORDERS (OUTPATIENT)
Dept: ADMINISTRATIVE | Facility: HOSPITAL | Age: 41
End: 2021-04-09

## 2021-04-09 DIAGNOSIS — M72.2 PLANTAR FASCIITIS, BILATERAL: ICD-10-CM

## 2021-04-09 DIAGNOSIS — M72.2 PLANTAR FASCIITIS, BILATERAL: Primary | ICD-10-CM

## 2021-04-09 PROCEDURE — 73630 X-RAY EXAM OF FOOT: CPT

## 2021-04-14 DIAGNOSIS — Z30.41 ENCOUNTER FOR SURVEILLANCE OF CONTRACEPTIVE PILLS: ICD-10-CM

## 2021-04-14 RX ORDER — DROSPIRENONE AND ETHINYL ESTRADIOL 0.03MG-3MG
KIT ORAL
Qty: 84 TABLET | Refills: 1 | Status: SHIPPED | OUTPATIENT
Start: 2021-04-14

## 2021-04-16 ENCOUNTER — BULK ORDERING (OUTPATIENT)
Dept: CASE MANAGEMENT | Facility: OTHER | Age: 41
End: 2021-04-16

## 2021-04-16 DIAGNOSIS — Z23 IMMUNIZATION DUE: ICD-10-CM

## 2021-07-02 ENCOUNTER — TRANSCRIBE ORDERS (OUTPATIENT)
Dept: ADMINISTRATIVE | Facility: HOSPITAL | Age: 41
End: 2021-07-02

## 2021-07-02 DIAGNOSIS — Z12.31 VISIT FOR SCREENING MAMMOGRAM: Primary | ICD-10-CM

## 2021-07-02 DIAGNOSIS — E28.2 PCOS (POLYCYSTIC OVARIAN SYNDROME): ICD-10-CM

## 2021-08-12 ENCOUNTER — HOSPITAL ENCOUNTER (OUTPATIENT)
Dept: ULTRASOUND IMAGING | Facility: HOSPITAL | Age: 41
Discharge: HOME | End: 2021-08-12

## 2021-08-12 ENCOUNTER — HOSPITAL ENCOUNTER (OUTPATIENT)
Dept: MAMMOGRAPHY | Facility: HOSPITAL | Age: 41
Discharge: HOME | End: 2021-08-12

## 2021-08-12 DIAGNOSIS — E28.2 PCOS (POLYCYSTIC OVARIAN SYNDROME): ICD-10-CM

## 2021-08-12 DIAGNOSIS — Z12.31 VISIT FOR SCREENING MAMMOGRAM: ICD-10-CM

## 2021-08-12 PROCEDURE — 76830 TRANSVAGINAL US NON-OB: CPT

## 2021-08-12 PROCEDURE — 77067 SCR MAMMO BI INCL CAD: CPT

## 2021-08-12 PROCEDURE — 76856 US EXAM PELVIC COMPLETE: CPT

## 2021-08-12 PROCEDURE — 77063 BREAST TOMOSYNTHESIS BI: CPT

## 2022-08-17 ENCOUNTER — TRANSCRIBE ORDERS (OUTPATIENT)
Dept: ADMINISTRATIVE | Facility: HOSPITAL | Age: 42
End: 2022-08-17

## 2022-08-17 DIAGNOSIS — Z12.31 SCREENING MAMMOGRAM FOR BREAST CANCER: Primary | ICD-10-CM

## 2022-08-23 ENCOUNTER — APPOINTMENT (OUTPATIENT)
Dept: MAMMOGRAPHY | Facility: HOSPITAL | Age: 42
End: 2022-08-23

## 2022-08-26 ENCOUNTER — HOSPITAL ENCOUNTER (OUTPATIENT)
Dept: MAMMOGRAPHY | Facility: HOSPITAL | Age: 42
Discharge: HOME | End: 2022-08-26
Admitting: FAMILY MEDICINE

## 2022-08-26 DIAGNOSIS — Z12.31 SCREENING MAMMOGRAM FOR BREAST CANCER: ICD-10-CM

## 2022-08-26 PROCEDURE — 77067 SCR MAMMO BI INCL CAD: CPT

## 2022-08-26 PROCEDURE — 77063 BREAST TOMOSYNTHESIS BI: CPT

## 2023-10-25 ENCOUNTER — TRANSCRIBE ORDERS (OUTPATIENT)
Dept: CARDIOLOGY | Facility: CLINIC | Age: 43
End: 2023-10-25
Payer: COMMERCIAL

## 2023-10-25 DIAGNOSIS — I10 HYPERTENSION, UNSPECIFIED TYPE: ICD-10-CM

## 2023-10-25 DIAGNOSIS — I15.8 SECONDARY DIASTOLIC HYPERTENSION: Primary | ICD-10-CM

## 2023-11-06 ENCOUNTER — HOSPITAL ENCOUNTER (OUTPATIENT)
Dept: CARDIOLOGY | Facility: HOSPITAL | Age: 43
Discharge: HOME | End: 2023-11-06
Admitting: FAMILY MEDICINE
Payer: COMMERCIAL

## 2023-11-06 VITALS
HEART RATE: 86 BPM | SYSTOLIC BLOOD PRESSURE: 130 MMHG | HEIGHT: 66 IN | BODY MASS INDEX: 37.61 KG/M2 | WEIGHT: 234 LBS | DIASTOLIC BLOOD PRESSURE: 98 MMHG

## 2023-11-06 DIAGNOSIS — I10 HYPERTENSION, UNSPECIFIED TYPE: ICD-10-CM

## 2023-11-06 LAB
AORTIC ARCH: 2.4 CM
ASCENDING AORTA: 2.6 CM
BH CV ECHO MEAS - ACS: 2.34 CM
BH CV ECHO MEAS - AO MAX PG: 4.1 MMHG
BH CV ECHO MEAS - AO MEAN PG: 2 MMHG
BH CV ECHO MEAS - AO ROOT DIAM: 3 CM
BH CV ECHO MEAS - AO V2 MAX: 101 CM/SEC
BH CV ECHO MEAS - AO V2 VTI: 16.4 CM
BH CV ECHO MEAS - AVA(I,D): 3 CM2
BH CV ECHO MEAS - EDV(CUBED): 59.3 ML
BH CV ECHO MEAS - EDV(MOD-SP2): 79 ML
BH CV ECHO MEAS - EDV(MOD-SP4): 89 ML
BH CV ECHO MEAS - EF(MOD-BP): 60.5 %
BH CV ECHO MEAS - EF(MOD-SP2): 63.3 %
BH CV ECHO MEAS - EF(MOD-SP4): 60.7 %
BH CV ECHO MEAS - ESV(CUBED): 16.8 ML
BH CV ECHO MEAS - ESV(MOD-SP2): 29 ML
BH CV ECHO MEAS - ESV(MOD-SP4): 35 ML
BH CV ECHO MEAS - FS: 34.4 %
BH CV ECHO MEAS - IVS/LVPW: 1 CM
BH CV ECHO MEAS - IVSD: 0.9 CM
BH CV ECHO MEAS - LAT PEAK E' VEL: 10.3 CM/SEC
BH CV ECHO MEAS - LV DIASTOLIC VOL/BSA (35-75): 41.6 CM2
BH CV ECHO MEAS - LV MASS(C)D: 105.3 GRAMS
BH CV ECHO MEAS - LV MAX PG: 3.1 MMHG
BH CV ECHO MEAS - LV MEAN PG: 2 MMHG
BH CV ECHO MEAS - LV SYSTOLIC VOL/BSA (12-30): 16.4 CM2
BH CV ECHO MEAS - LV V1 MAX: 88.1 CM/SEC
BH CV ECHO MEAS - LV V1 VTI: 15 CM
BH CV ECHO MEAS - LVIDD: 3.9 CM
BH CV ECHO MEAS - LVIDS: 2.6 CM
BH CV ECHO MEAS - LVOT AREA: 3.3 CM2
BH CV ECHO MEAS - LVOT DIAM: 2.05 CM
BH CV ECHO MEAS - LVPWD: 0.9 CM
BH CV ECHO MEAS - MED PEAK E' VEL: 7.8 CM/SEC
BH CV ECHO MEAS - MV A DUR: 0.11 SEC
BH CV ECHO MEAS - MV A MAX VEL: 55.7 CM/SEC
BH CV ECHO MEAS - MV DEC SLOPE: 496.9 CM/SEC2
BH CV ECHO MEAS - MV DEC TIME: 0.17 SEC
BH CV ECHO MEAS - MV E MAX VEL: 55.3 CM/SEC
BH CV ECHO MEAS - MV E/A: 0.99
BH CV ECHO MEAS - MV MAX PG: 2.15 MMHG
BH CV ECHO MEAS - MV MEAN PG: 1.09 MMHG
BH CV ECHO MEAS - MV P1/2T: 45.6 MSEC
BH CV ECHO MEAS - MV V2 VTI: 18.7 CM
BH CV ECHO MEAS - MVA(P1/2T): 4.8 CM2
BH CV ECHO MEAS - MVA(VTI): 2.7 CM2
BH CV ECHO MEAS - PA ACC TIME: 0.12 SEC
BH CV ECHO MEAS - PA V2 MAX: 93.7 CM/SEC
BH CV ECHO MEAS - PULM A REVS DUR: 0.13 SEC
BH CV ECHO MEAS - PULM A REVS VEL: 30.4 CM/SEC
BH CV ECHO MEAS - PULM DIAS VEL: 30.8 CM/SEC
BH CV ECHO MEAS - PULM S/D: 1.33
BH CV ECHO MEAS - PULM SYS VEL: 41.1 CM/SEC
BH CV ECHO MEAS - QP/QS: 0.7
BH CV ECHO MEAS - RAP SYSTOLE: 3 MMHG
BH CV ECHO MEAS - RV MAX PG: 1.38 MMHG
BH CV ECHO MEAS - RV V1 MAX: 58.7 CM/SEC
BH CV ECHO MEAS - RV V1 VTI: 11.6 CM
BH CV ECHO MEAS - RVOT DIAM: 1.95 CM
BH CV ECHO MEAS - RVSP: 20 MMHG
BH CV ECHO MEAS - SI(MOD-SP2): 23.4 ML/M2
BH CV ECHO MEAS - SI(MOD-SP4): 25.3 ML/M2
BH CV ECHO MEAS - SUP REN AO DIAM: 1.8 CM
BH CV ECHO MEAS - SV(LVOT): 49.6 ML
BH CV ECHO MEAS - SV(MOD-SP2): 50 ML
BH CV ECHO MEAS - SV(MOD-SP4): 54 ML
BH CV ECHO MEAS - SV(RVOT): 34.7 ML
BH CV ECHO MEAS - TAPSE (>1.6): 2.44 CM
BH CV ECHO MEAS - TR MAX PG: 17 MMHG
BH CV ECHO MEAS - TR MAX VEL: 206.3 CM/SEC
BH CV ECHO MEASUREMENTS AVERAGE E/E' RATIO: 6.11
BH CV XLRA - RV BASE: 2.37 CM
BH CV XLRA - RV LENGTH: 7.8 CM
BH CV XLRA - RV MID: 2.46 CM
BH CV XLRA - TDI S': 12.1 CM/SEC
LEFT ATRIUM VOLUME INDEX: 11.3 ML/M2
SINUS: 3 CM
STJ: 2.8 CM

## 2023-11-06 PROCEDURE — 93306 TTE W/DOPPLER COMPLETE: CPT

## 2023-11-06 PROCEDURE — 93306 TTE W/DOPPLER COMPLETE: CPT | Performed by: INTERNAL MEDICINE

## (undated) DEVICE — CANN NASL CO2 TRULINK W/O2 A/

## (undated) DEVICE — TBG 02 CRUSH RESIST LF CLR 7FT

## (undated) DEVICE — Device: Brand: DEFENDO AIR/WATER/SUCTION AND BIOPSY VALVE

## (undated) DEVICE — TUBING, SUCTION, 1/4" X 10', STRAIGHT: Brand: MEDLINE

## (undated) DEVICE — BITEBLOCK OMNI BLOC